# Patient Record
Sex: FEMALE | Race: WHITE | NOT HISPANIC OR LATINO | Employment: OTHER | URBAN - METROPOLITAN AREA
[De-identification: names, ages, dates, MRNs, and addresses within clinical notes are randomized per-mention and may not be internally consistent; named-entity substitution may affect disease eponyms.]

---

## 2017-01-06 ENCOUNTER — HOSPITAL ENCOUNTER (OUTPATIENT)
Dept: LAB | Facility: MEDICAL CENTER | Age: 67
End: 2017-01-06
Attending: FAMILY MEDICINE
Payer: COMMERCIAL

## 2017-01-06 LAB
25(OH)D3 SERPL-MCNC: 26 NG/ML (ref 30–100)
ALBUMIN SERPL BCP-MCNC: 4.7 G/DL (ref 3.2–4.9)
ALBUMIN/GLOB SERPL: 1.8 G/DL
ALP SERPL-CCNC: 59 U/L (ref 30–99)
ALT SERPL-CCNC: 16 U/L (ref 2–50)
ANION GAP SERPL CALC-SCNC: 7 MMOL/L (ref 0–11.9)
APPEARANCE UR: CLEAR
AST SERPL-CCNC: 21 U/L (ref 12–45)
BACTERIA #/AREA URNS HPF: ABNORMAL /HPF
BASOPHILS # BLD AUTO: 0.03 K/UL (ref 0–0.12)
BASOPHILS NFR BLD AUTO: 0.7 % (ref 0–1.8)
BILIRUB SERPL-MCNC: 0.9 MG/DL (ref 0.1–1.5)
BILIRUB UR QL STRIP.AUTO: NEGATIVE
BUN SERPL-MCNC: 12 MG/DL (ref 8–22)
CALCIUM SERPL-MCNC: 9.7 MG/DL (ref 8.5–10.5)
CHLORIDE SERPL-SCNC: 103 MMOL/L (ref 96–112)
CHOLEST SERPL-MCNC: 237 MG/DL (ref 100–199)
CO2 SERPL-SCNC: 28 MMOL/L (ref 20–33)
COLOR UR AUTO: ABNORMAL
CREAT SERPL-MCNC: 0.68 MG/DL (ref 0.5–1.4)
CULTURE IF INDICATED INDCX: YES UA CULTURE
EOSINOPHIL # BLD: 0.08 K/UL (ref 0–0.51)
EOSINOPHIL NFR BLD AUTO: 1.8 % (ref 0–6.9)
EPITHELIAL CELLS 1715: ABNORMAL /HPF
EPITHELIAL CELLS RENAL  1715R: ABNORMAL /HPF
ERYTHROCYTE [DISTWIDTH] IN BLOOD BY AUTOMATED COUNT: 42.1 FL (ref 35.9–50)
GLOBULIN SER CALC-MCNC: 2.6 G/DL (ref 1.9–3.5)
GLUCOSE SERPL-MCNC: 99 MG/DL (ref 65–99)
GLUCOSE UR STRIP.AUTO-MCNC: NEGATIVE MG/DL
HCT VFR BLD AUTO: 43.6 % (ref 37–47)
HDLC SERPL-MCNC: 70 MG/DL
HGB BLD-MCNC: 15 G/DL (ref 12–16)
IMM GRANULOCYTES # BLD AUTO: 0.01 K/UL (ref 0–0.11)
IMM GRANULOCYTES NFR BLD AUTO: 0.2 % (ref 0–0.9)
KETONES UR STRIP.AUTO-MCNC: NEGATIVE MG/DL
LDLC SERPL CALC-MCNC: 145 MG/DL
LEUKOCYTE ESTERASE UR QL STRIP.AUTO: ABNORMAL
LYMPHOCYTES # BLD: 1.86 K/UL (ref 1–4.8)
LYMPHOCYTES NFR BLD AUTO: 41.3 % (ref 22–41)
MCH RBC QN AUTO: 31.8 PG (ref 27–33)
MCHC RBC AUTO-ENTMCNC: 34.4 G/DL (ref 33.6–35)
MCV RBC AUTO: 92.6 FL (ref 81.4–97.8)
MICRO URNS: ABNORMAL
MONOCYTES # BLD: 0.31 K/UL (ref 0–0.85)
MONOCYTES NFR BLD AUTO: 6.9 % (ref 0–13.4)
NEUTROPHILS # BLD: 2.21 K/UL (ref 2–7.15)
NEUTROPHILS NFR BLD AUTO: 49.1 % (ref 44–72)
NITRITE UR QL STRIP.AUTO: NEGATIVE
NRBC # BLD AUTO: 0 K/UL
NRBC BLD-RTO: 0 /100 WBC
PH UR: 6 [PH]
PLATELET # BLD AUTO: 271 K/UL (ref 164–446)
PMV BLD AUTO: 10.2 FL (ref 9–12.9)
POTASSIUM SERPL-SCNC: 4.2 MMOL/L (ref 3.6–5.5)
PROT SERPL-MCNC: 7.3 G/DL (ref 6–8.2)
PROT UR QL STRIP: NEGATIVE MG/DL
RBC # BLD AUTO: 4.71 M/UL (ref 4.2–5.4)
RBC #/AREA URNS HPF: ABNORMAL /HPF
RBC UR QL AUTO: NEGATIVE
SODIUM SERPL-SCNC: 138 MMOL/L (ref 135–145)
SP GR UR STRIP.AUTO: 1.01
TRIGL SERPL-MCNC: 109 MG/DL (ref 0–149)
TSH SERPL DL<=0.005 MIU/L-ACNC: 2.55 UIU/ML (ref 0.3–3.7)
WBC # BLD AUTO: 4.5 K/UL (ref 4.8–10.8)
WBC #/AREA URNS HPF: ABNORMAL /HPF

## 2017-01-06 PROCEDURE — 87077 CULTURE AEROBIC IDENTIFY: CPT

## 2017-01-06 PROCEDURE — 80053 COMPREHEN METABOLIC PANEL: CPT

## 2017-01-06 PROCEDURE — 84443 ASSAY THYROID STIM HORMONE: CPT

## 2017-01-06 PROCEDURE — 81001 URINALYSIS AUTO W/SCOPE: CPT

## 2017-01-06 PROCEDURE — 85025 COMPLETE CBC W/AUTO DIFF WBC: CPT

## 2017-01-06 PROCEDURE — 36415 COLL VENOUS BLD VENIPUNCTURE: CPT

## 2017-01-06 PROCEDURE — 80061 LIPID PANEL: CPT

## 2017-01-06 PROCEDURE — 82306 VITAMIN D 25 HYDROXY: CPT

## 2017-01-06 PROCEDURE — 87086 URINE CULTURE/COLONY COUNT: CPT

## 2017-01-09 LAB
BACTERIA UR CULT: ABNORMAL
BACTERIA UR CULT: ABNORMAL
SIGNIFICANT IND 70042: ABNORMAL
SOURCE SOURCE: ABNORMAL

## 2017-08-08 ENCOUNTER — HOSPITAL ENCOUNTER (OUTPATIENT)
Dept: RADIOLOGY | Facility: MEDICAL CENTER | Age: 67
End: 2017-08-08
Attending: OBSTETRICS & GYNECOLOGY
Payer: MEDICARE

## 2017-08-08 DIAGNOSIS — Z12.31 ENCOUNTER FOR SCREENING MAMMOGRAM FOR MALIGNANT NEOPLASM OF BREAST: ICD-10-CM

## 2017-08-08 PROCEDURE — 77063 BREAST TOMOSYNTHESIS BI: CPT

## 2018-02-06 ENCOUNTER — OFFICE VISIT (OUTPATIENT)
Dept: MEDICAL GROUP | Facility: MEDICAL CENTER | Age: 68
End: 2018-02-06
Payer: MEDICARE

## 2018-02-06 VITALS
HEART RATE: 84 BPM | OXYGEN SATURATION: 98 % | DIASTOLIC BLOOD PRESSURE: 68 MMHG | HEIGHT: 62 IN | WEIGHT: 145.5 LBS | BODY MASS INDEX: 26.78 KG/M2 | RESPIRATION RATE: 16 BRPM | SYSTOLIC BLOOD PRESSURE: 104 MMHG | TEMPERATURE: 97.9 F

## 2018-02-06 DIAGNOSIS — M89.9 OSTEOPATHY: ICD-10-CM

## 2018-02-06 DIAGNOSIS — E55.9 VITAMIN D DEFICIENCY: ICD-10-CM

## 2018-02-06 DIAGNOSIS — Z76.89 ESTABLISHING CARE WITH NEW DOCTOR, ENCOUNTER FOR: ICD-10-CM

## 2018-02-06 DIAGNOSIS — Z00.00 PREVENTATIVE HEALTH CARE: ICD-10-CM

## 2018-02-06 PROCEDURE — 99213 OFFICE O/P EST LOW 20 MIN: CPT | Performed by: INTERNAL MEDICINE

## 2018-02-06 ASSESSMENT — PATIENT HEALTH QUESTIONNAIRE - PHQ9: CLINICAL INTERPRETATION OF PHQ2 SCORE: 0

## 2018-02-06 NOTE — PROGRESS NOTES
Chief Complaint   Patient presents with   • Establish Care     Chief complaint: History of osteopenia      HISTORY OF PRESENT ILLNESS: Patient is a 67 y.o. female patient who presents today to discuss the evaluation and management of:          1. Establishing care with new doctor, encounter for    Patient's 2 previous physicians have retired. She generally is a very healthy woman, she was last seen for an annual exam about one year ago. She saw her GYN and had a mammogram done in August 2017.  She had a colonoscopy at age 62, and has had her flu shot this year.  2. Osteopathy    DEXA scan in January 2014 was notable for osteopenia. The patient is not currently taking calcium supplementation. She has not fallen and has no history of fractures.    3. Vitamin D deficiency    Last vitamin D level was 26 in January 2017. She takes 1000 IUs about every other day.    4. Preventative health care    Results for MAURICE SALVADOR (MRN [id number]) as of 2/6/2018 13:12   Ref. Range 1/6/2017 08:30   Cholesterol,Tot Latest Ref Range: 100 - 199 mg/dL 237 (H)   Triglycerides Latest Ref Range: 0 - 149 mg/dL 109   HDL Latest Ref Range: >=40 mg/dL 70   LDL Latest Ref Range: <100 mg/dL 145 (H)     Patient has elevated LDL however in the setting of a high HDL. She is not on any medication. She is following  weight watcher's diet and has lost 10 pounds through exercise and prudent diet.        Patient Active Problem List    Diagnosis Date Noted   • Vitamin D deficiency 12/12/2013   • Osteopenia 12/09/2013        Allergies:Patient has no known allergies.    Current meds including changes today  Current Outpatient Prescriptions   Medication Sig Dispense Refill   • vitamin D (CHOLECALCIFEROL) 1000 UNIT TABS Take 1,000 Units by mouth every day.     • Cyanocobalamin (B-12) 1000 MCG CAPS Take 1 Cap by mouth every day.       No current facility-administered medications for this visit.      Social History   Substance Use Topics   • Smoking status: Never  "Smoker   • Smokeless tobacco: Never Used   • Alcohol use 3.0 - 4.2 oz/week     5 - 7 Glasses of wine per week      Comment: one glass of wine     Social History     Social History Narrative   • No narrative on file       Family History   Problem Relation Age of Onset   • Heart Disease Mother    • Stroke Mother    • Hypertension Mother    • Hyperlipidemia Mother    • Cancer Mother      breast       Review of Systems:  No chest pain, No shortness of breath, No dyspnea on exertion  Gastrointestinal ROS: No abdominal pain, No nausea, vomiting, diarrhea, or constipation        Exam:      Blood pressure 104/68, pulse 84, temperature 36.6 °C (97.9 °F), resp. rate 16, height 1.575 m (5' 2\"), weight 66 kg (145 lb 8.1 oz), SpO2 98 %, not currently breastfeeding.  General:  Well nourished, well developed female in NAD affect and mood within normal limits  Head is grossly normal.  Neck: Supple without adenopathy  Pulmonary: Clear to ausculation.  Normal effort. No rales, rhonchi, or wheezing.  Cardiovascular: Regular rate and rhythm without murmur.   Extremities: no clubbing, cyanosis, or edema.  Neuro: moves all extremities symmetrically    Please note that this dictation was created using voice recognition software. I have made every reasonable attempt to correct obvious errors, but I expect that there are errors of grammar and possibly content that I did not discover before finalizing the note.    Assessment/Plan:  1. Establishing care with new doctor, encounter for    Up-to-date on all screening measures    2. Osteopathy      - DS-BONE DENSITY STUDY (DEXA); Future    3. Vitamin D deficiency      - VITAMIN D,25 HYDROXY; Future    4. Preventative health care      - LIPID PROFILE; Future  - COMP METABOLIC PANEL; Future    Followup: We will give the patient the results of her lab work through my chart, follow up annually sooner when necessary       "

## 2018-02-14 ENCOUNTER — HOSPITAL ENCOUNTER (OUTPATIENT)
Dept: LAB | Facility: MEDICAL CENTER | Age: 68
End: 2018-02-14
Attending: INTERNAL MEDICINE
Payer: MEDICARE

## 2018-02-14 DIAGNOSIS — Z00.00 PREVENTATIVE HEALTH CARE: ICD-10-CM

## 2018-02-14 DIAGNOSIS — E55.9 VITAMIN D DEFICIENCY: ICD-10-CM

## 2018-02-14 LAB
25(OH)D3 SERPL-MCNC: 20 NG/ML (ref 30–100)
ALBUMIN SERPL BCP-MCNC: 4.2 G/DL (ref 3.2–4.9)
ALBUMIN/GLOB SERPL: 1.4 G/DL
ALP SERPL-CCNC: 63 U/L (ref 30–99)
ALT SERPL-CCNC: 12 U/L (ref 2–50)
ANION GAP SERPL CALC-SCNC: 5 MMOL/L (ref 0–11.9)
AST SERPL-CCNC: 20 U/L (ref 12–45)
BILIRUB SERPL-MCNC: 0.8 MG/DL (ref 0.1–1.5)
BUN SERPL-MCNC: 10 MG/DL (ref 8–22)
CALCIUM SERPL-MCNC: 9.4 MG/DL (ref 8.5–10.5)
CHLORIDE SERPL-SCNC: 102 MMOL/L (ref 96–112)
CHOLEST SERPL-MCNC: 222 MG/DL (ref 100–199)
CO2 SERPL-SCNC: 30 MMOL/L (ref 20–33)
CREAT SERPL-MCNC: 0.66 MG/DL (ref 0.5–1.4)
GLOBULIN SER CALC-MCNC: 3 G/DL (ref 1.9–3.5)
GLUCOSE SERPL-MCNC: 92 MG/DL (ref 65–99)
HDLC SERPL-MCNC: 66 MG/DL
LDLC SERPL CALC-MCNC: 130 MG/DL
POTASSIUM SERPL-SCNC: 4.2 MMOL/L (ref 3.6–5.5)
PROT SERPL-MCNC: 7.2 G/DL (ref 6–8.2)
SODIUM SERPL-SCNC: 137 MMOL/L (ref 135–145)
TRIGL SERPL-MCNC: 130 MG/DL (ref 0–149)

## 2018-02-14 PROCEDURE — 82306 VITAMIN D 25 HYDROXY: CPT

## 2018-02-14 PROCEDURE — 80053 COMPREHEN METABOLIC PANEL: CPT

## 2018-02-14 PROCEDURE — 80061 LIPID PANEL: CPT | Mod: GA

## 2018-02-14 PROCEDURE — 36415 COLL VENOUS BLD VENIPUNCTURE: CPT

## 2018-02-22 ENCOUNTER — HOSPITAL ENCOUNTER (OUTPATIENT)
Dept: RADIOLOGY | Facility: MEDICAL CENTER | Age: 68
End: 2018-02-22
Attending: INTERNAL MEDICINE
Payer: MEDICARE

## 2018-02-22 DIAGNOSIS — M89.9 OSTEOPATHY: ICD-10-CM

## 2018-02-22 PROCEDURE — 77080 DXA BONE DENSITY AXIAL: CPT

## 2018-09-26 ENCOUNTER — HOSPITAL ENCOUNTER (OUTPATIENT)
Dept: RADIOLOGY | Facility: MEDICAL CENTER | Age: 68
End: 2018-09-26
Attending: INTERNAL MEDICINE
Payer: MEDICARE

## 2018-09-26 DIAGNOSIS — Z12.39 SCREENING BREAST EXAMINATION: ICD-10-CM

## 2018-09-26 PROCEDURE — 77067 SCR MAMMO BI INCL CAD: CPT

## 2018-11-01 ENCOUNTER — OFFICE VISIT (OUTPATIENT)
Dept: MEDICAL GROUP | Facility: MEDICAL CENTER | Age: 68
End: 2018-11-01
Payer: MEDICARE

## 2018-11-01 VITALS
HEART RATE: 84 BPM | BODY MASS INDEX: 26.76 KG/M2 | WEIGHT: 145.4 LBS | SYSTOLIC BLOOD PRESSURE: 132 MMHG | RESPIRATION RATE: 16 BRPM | DIASTOLIC BLOOD PRESSURE: 80 MMHG | OXYGEN SATURATION: 97 % | HEIGHT: 62 IN | TEMPERATURE: 97.7 F

## 2018-11-01 DIAGNOSIS — M47.22 CERVICAL RADICULOPATHY DUE TO DEGENERATIVE JOINT DISEASE OF SPINE: ICD-10-CM

## 2018-11-01 DIAGNOSIS — Z00.00 PREVENTATIVE HEALTH CARE: ICD-10-CM

## 2018-11-01 DIAGNOSIS — E55.9 VITAMIN D DEFICIENCY: ICD-10-CM

## 2018-11-01 PROCEDURE — 99213 OFFICE O/P EST LOW 20 MIN: CPT | Performed by: INTERNAL MEDICINE

## 2018-11-01 NOTE — PROGRESS NOTES
Chief Complaint   Patient presents with   • Numbness     bilat. x 10 years,pt states symptoms worsened mid september       HISTORY OF PRESENT ILLNESS: Patient is a 68 y.o. female patient who presents today to discuss the evaluation and management of:          1. Cervical radiculopathy due to degenerative joint disease of spine    Patient is complaining of numbness in both hands, she had an MRI about 10 years ago of her cervical spine that showed degenerative disc disease.  She had physical therapy at that time with improvement.  She has noticed increasing numbness, particularly at night which is interfering with her sleep.  She also has some weakness in her right hand, she is unable to do so as she used to.  At this time, she would rather not have the intervention except for a trial of therapy.    2. Vitamin D deficiency    Vitamin D level was low (20) in February.    3. [facility] health care    Results for MAURICE SALVADOR (MRN [id number]) as of 11/1/2018 10:50   Ref. Range 2/14/2018 07:51   Cholesterol,Tot Latest Ref Range: 100 - 199 mg/dL 222 (H)   Triglycerides Latest Ref Range: 0 - 149 mg/dL 130   HDL Latest Ref Range: >=40 mg/dL 66   LDL Latest Ref Range: <100 mg/dL 130 (H)       Mild hyperlipidemia    Patient Active Problem List    Diagnosis Date Noted   • Cervical radiculopathy due to degenerative joint disease of spine 11/01/2018   • Vitamin D deficiency 12/12/2013   • Osteopenia 12/09/2013        Allergies:Patient has no known allergies.    Current meds including changes today  Current Outpatient Prescriptions   Medication Sig Dispense Refill   • vitamin D (CHOLECALCIFEROL) 1000 UNIT TABS Take 1,000 Units by mouth every day.     • Cyanocobalamin (B-12) 1000 MCG CAPS Take 1 Cap by mouth every day.       No current facility-administered medications for this visit.      Social History   Substance Use Topics   • Smoking status: Never Smoker   • Smokeless tobacco: Never Used   • Alcohol use 3.0 - 4.2 oz/week     5  "- 7 Glasses of wine per week      Comment: one glass of wine     Social History     Social History Narrative   • No narrative on file       Family History   Problem Relation Age of Onset   • Heart Disease Mother    • Stroke Mother    • Hypertension Mother    • Hyperlipidemia Mother    • Cancer Mother         breast       Review of Systems:  No chest pain, No shortness of breath, No dyspnea on exertion  Gastrointestinal ROS: No abdominal pain, No nausea, vomiting, diarrhea, or constipation        Exam:      Blood pressure 132/80, pulse 84, temperature 36.5 °C (97.7 °F), temperature source Temporal, resp. rate 16, height 1.575 m (5' 2\"), weight 66 kg (145 lb 6.4 oz), SpO2 97 %, not currently breastfeeding.  General:  Well nourished, well developed female in NAD affect and mood within normal limits  Head is grossly normal.  Neck: Supple without adenopathy  Pulmonary: Clear to ausculation.  Normal effort. No rales, rhonchi, or wheezing.  Cardiovascular: Regular rate and rhythm without murmur.   Extremities: no clubbing, cyanosis, or edema.  Neuro: moves all extremities symmetrically    Please note that this dictation was created using voice recognition software. I have made every reasonable attempt to correct obvious errors, but I expect that there are errors of grammar and possibly content that I did not discover before finalizing the note.    Assessment/Plan:  1. Cervical radiculopathy due to degenerative joint disease of spine    Patient will let me know if her or her therapist feel that she is not responding to treatment, and then I can order MRI.    - REFERRAL TO PHYSICAL THERAPY Reason for Therapy: Eval/Treat/Report    2. Vitamin D deficiency    Patient will go to have her blood drawn in February when she is due for her annual  - VITAMIN D,25 HYDROXY; Future    3. Preventative health care      - LIPID PROFILE; Future  - BASIC METABOLIC PANEL; Future    Followup: Patient will make appointment for annual in " February.

## 2019-02-28 ENCOUNTER — HOSPITAL ENCOUNTER (OUTPATIENT)
Dept: LAB | Facility: MEDICAL CENTER | Age: 69
End: 2019-02-28
Attending: INTERNAL MEDICINE
Payer: MEDICARE

## 2019-02-28 DIAGNOSIS — Z00.00 PREVENTATIVE HEALTH CARE: ICD-10-CM

## 2019-02-28 DIAGNOSIS — E55.9 VITAMIN D DEFICIENCY: ICD-10-CM

## 2019-02-28 LAB
25(OH)D3 SERPL-MCNC: 38 NG/ML (ref 30–100)
ANION GAP SERPL CALC-SCNC: 9 MMOL/L (ref 0–11.9)
BUN SERPL-MCNC: 17 MG/DL (ref 8–22)
CALCIUM SERPL-MCNC: 10.2 MG/DL (ref 8.5–10.5)
CHLORIDE SERPL-SCNC: 103 MMOL/L (ref 96–112)
CHOLEST SERPL-MCNC: 205 MG/DL (ref 100–199)
CO2 SERPL-SCNC: 26 MMOL/L (ref 20–33)
CREAT SERPL-MCNC: 0.79 MG/DL (ref 0.5–1.4)
FASTING STATUS PATIENT QL REPORTED: NORMAL
GLUCOSE SERPL-MCNC: 92 MG/DL (ref 65–99)
HDLC SERPL-MCNC: 67 MG/DL
LDLC SERPL CALC-MCNC: 120 MG/DL
POTASSIUM SERPL-SCNC: 4.4 MMOL/L (ref 3.6–5.5)
SODIUM SERPL-SCNC: 138 MMOL/L (ref 135–145)
TRIGL SERPL-MCNC: 88 MG/DL (ref 0–149)

## 2019-02-28 PROCEDURE — 82306 VITAMIN D 25 HYDROXY: CPT

## 2019-02-28 PROCEDURE — 80048 BASIC METABOLIC PNL TOTAL CA: CPT

## 2019-02-28 PROCEDURE — 80061 LIPID PANEL: CPT | Mod: GA

## 2019-02-28 PROCEDURE — 36415 COLL VENOUS BLD VENIPUNCTURE: CPT

## 2019-10-31 ENCOUNTER — NON-PROVIDER VISIT (OUTPATIENT)
Dept: MEDICAL GROUP | Facility: MEDICAL CENTER | Age: 69
End: 2019-10-31
Payer: MEDICARE

## 2019-10-31 DIAGNOSIS — Z23 NEED FOR VACCINATION: ICD-10-CM

## 2019-10-31 PROCEDURE — 90662 IIV NO PRSV INCREASED AG IM: CPT | Performed by: FAMILY MEDICINE

## 2019-10-31 PROCEDURE — G0008 ADMIN INFLUENZA VIRUS VAC: HCPCS | Performed by: FAMILY MEDICINE

## 2019-10-31 NOTE — PROGRESS NOTES
"Iesha Galvan is a 69 y.o. female here for a non-provider visit for:   FLU    Reason for immunization: Annual Flu Vaccine  Immunization records indicate need for vaccine: Yes, confirmed with GIANCARLO Coronel  Minimum interval has been met for this vaccine: Yes  ABN completed: No    Order and dose verified by: bakari  VIS Dated  8/15/2019 was given to patient: Yes  All IAC Questionnaire questions were answered \"No.\"    Patient tolerated injection and no adverse effects were observed or reported: Yes    Pt scheduled for next dose in series: No    "

## 2019-12-31 ENCOUNTER — OFFICE VISIT (OUTPATIENT)
Dept: MEDICAL GROUP | Facility: MEDICAL CENTER | Age: 69
End: 2019-12-31
Payer: MEDICARE

## 2019-12-31 VITALS
OXYGEN SATURATION: 97 % | SYSTOLIC BLOOD PRESSURE: 104 MMHG | TEMPERATURE: 97.5 F | HEIGHT: 62 IN | WEIGHT: 149.03 LBS | DIASTOLIC BLOOD PRESSURE: 66 MMHG | BODY MASS INDEX: 27.43 KG/M2 | HEART RATE: 75 BPM

## 2019-12-31 DIAGNOSIS — E78.00 PURE HYPERCHOLESTEROLEMIA: ICD-10-CM

## 2019-12-31 DIAGNOSIS — M79.644 PAIN IN FINGER OF BOTH HANDS: ICD-10-CM

## 2019-12-31 DIAGNOSIS — E66.3 OVERWEIGHT: ICD-10-CM

## 2019-12-31 DIAGNOSIS — Z11.59 NEED FOR HEPATITIS C SCREENING TEST: ICD-10-CM

## 2019-12-31 DIAGNOSIS — H93.8X1 SENSATION OF FULLNESS IN RIGHT EAR: ICD-10-CM

## 2019-12-31 DIAGNOSIS — Z23 NEED FOR VACCINATION: ICD-10-CM

## 2019-12-31 DIAGNOSIS — M47.22 CERVICAL RADICULOPATHY DUE TO DEGENERATIVE JOINT DISEASE OF SPINE: ICD-10-CM

## 2019-12-31 DIAGNOSIS — M79.645 PAIN IN FINGER OF BOTH HANDS: ICD-10-CM

## 2019-12-31 PROCEDURE — 99214 OFFICE O/P EST MOD 30 MIN: CPT | Mod: 25 | Performed by: FAMILY MEDICINE

## 2019-12-31 PROCEDURE — G0009 ADMIN PNEUMOCOCCAL VACCINE: HCPCS | Performed by: FAMILY MEDICINE

## 2019-12-31 PROCEDURE — 90670 PCV13 VACCINE IM: CPT | Performed by: FAMILY MEDICINE

## 2019-12-31 ASSESSMENT — PATIENT HEALTH QUESTIONNAIRE - PHQ9: CLINICAL INTERPRETATION OF PHQ2 SCORE: 0

## 2019-12-31 NOTE — ASSESSMENT & PLAN NOTE
Chronic problem. She was referred for PT, however, did not ever start. Continues to have neck pain, worst on the right side. Gets b/l intermittent hand numbness at night, but improves immediately if she moves her arms around. She does plan to schedule an appt with PT.

## 2019-12-31 NOTE — ASSESSMENT & PLAN NOTE
New problem. Started with a cold about 2 weeks ago. Now the right ear feels plugged and full. No pain. Notes decreased hearing. Popping frequently.

## 2019-12-31 NOTE — ASSESSMENT & PLAN NOTE
Genetic h/o duputryns. Starting on the 5th digit on the right hand. However, only having significant pain on the DIP of the 3rd finginer of the left hand. Red and swollen. WOrst in the morning. Has been going for years.

## 2019-12-31 NOTE — PROGRESS NOTES
Subjective:     CC: Diagnoses of Cervical radiculopathy due to degenerative joint disease of spine, Sensation of fullness in right ear, Pain in finger of both hands, Pure hypercholesterolemia, Overweight, and Need for hepatitis C screening test were pertinent to this visit.    HPI: Patient is a 69 y.o. female established patient who presents today to establish care.  She is generally quite healthy.  Her only chronic medical problem is cervical radiculopathy.  She does have a couple other concerns today.      Cervical radiculopathy due to degenerative joint disease of spine  Chronic problem. She was referred for PT, however, did not ever start. Continues to have neck pain, worst on the right side. Gets b/l intermittent hand numbness at night, but improves immediately if she moves her arms around. She does plan to schedule an appt with PT.     Sensation of fullness in right ear  New problem. Started with a cold about 2 weeks ago. Now the right ear feels plugged and full. No pain. Notes decreased hearing. Popping frequently.     Pain in finger of both hands  Genetic h/o duputryns. Starting on the 5th digit on the right hand. However, only having significant pain on the DIP of the 3rd finginer of the left hand. Red and swollen. WOrst in the morning. Has been going for years.       Past Medical History:   Diagnosis Date   • Endometriosis of ovary    • Osteopenia 12/9/2013       Social History     Tobacco Use   • Smoking status: Never Smoker   • Smokeless tobacco: Never Used   Substance Use Topics   • Alcohol use: Yes     Alcohol/week: 3.0 - 4.2 oz     Types: 5 - 7 Glasses of wine per week     Comment: one glass of wine   • Drug use: No       Current Outpatient Medications Ordered in Epic   Medication Sig Dispense Refill   • Diclofenac Sodium 1 % Gel Apply thin layer to affected joint qid prn pain. 1 Tube 2   • vitamin D (CHOLECALCIFEROL) 1000 UNIT TABS Take 1,000 Units by mouth every day.     • Cyanocobalamin (B-12) 1000  "MCG CAPS Take 1 Cap by mouth every day.       No current [facility]-ordered facility-administered medications on file.        Allergies:  Patient has no known allergies.    Health Maintenance: Completed    ROS:  Pulm: no sob, no cough  CV: no chest pain, no palpitations      Objective:       Exam:  /66 (BP Location: Left arm, Patient Position: Sitting, BP Cuff Size: Adult)   Pulse 75   Temp 36.4 °C (97.5 °F) (Temporal)   Ht 1.575 m (5' 2\")   Wt 67.6 kg (149 lb 0.5 oz)   SpO2 97%   BMI 27.26 kg/m²  Body mass index is 27.26 kg/m².      General: Normal appearing. No distress.  HEAD: NCAT  EYES: conjunctiva clear, lids without ptosis, pupils equal  and reactive to light  EARS: ears normal shape and contour, bilateral cerumen impaction.  Unable to visualize tympanic membranes.  MOUTH: oropharynx is without erythema, edema or exudates.   Neck: Supple without masses. Thyroid is not enlarged. Normal ROM  Pulmonary: Clear to ausculation.  Normal effort. No rales, ronchi, or wheezing.  Cardiovascular: Regular rate and rhythm, no murmur. No LE edema  Neurologic: Grossly normal, no focal deficits  Lymph: No cervical or supraclavicular lymph nodes are palpable  Skin: Warm and dry.  No obvious lesions.  Musculoskeletal: Normal gait and station.   Psych: Normal mood and affect. Alert and oriented x3. Judgment and insight is normal.      Labs: 2/28/19 Results reviewed and discussed with the patient, questions answered.    Assessment & Plan:     69 y.o. female with the following -     1. Cervical radiculopathy due to degenerative joint disease of spine  Chronic problem, uncontrolled.  Patient continues to right-sided neck pain.  She does have intermittent numbness in the hands, however, only at night and improved with positional change.  Encouraged the patient to schedule an appointment physical therapy, referral was already placed.    2. Sensation of fullness in right ear  New problem.  Started after a cold around 2 weeks " ago.  Possibly due to eustachian dysfunction, however, the patient does have bilateral cerumen impaction.  Ears flushed today with significant improvement in symptoms.     3. Pain in finger of both hands  New problem.  The patient states that she has had pain in the DIP of the third finger on the left hand for many years.  Notes redness and swelling in that joint as well over the past few years.  She is not interested in lab work-up or imaging today.  Prescription given for diclofenac gel.  No signs of infection.  No signs of trigger finger.  Advised to follow-up if pain worsens.  - Diclofenac Sodium 1 % Gel; Apply thin layer to affected joint qid prn pain.  Dispense: 1 Tube; Refill: 2    4. Pure hypercholesterolemia  Chronic problem.  Lipid panel done in February 2019 showed a slight elevation of her LDL at 120.  The patient had been working on diet and exercise and lowered it from 140 previously.  The patient has noted around 5 pounds of weight gain over the past year.  Repeat lipid panel ordered.  - Lipid Profile; Future  - Comp Metabolic Panel; Future    5. Overweight  - Comp Metabolic Panel; Future    6. Need for hepatitis C screening test  - HEP C VIRUS ANTIBODY; Future      No follow-ups on file.    Please note that this dictation was created using voice recognition software. I have made every reasonable attempt to correct obvious errors, but I expect that there are errors of grammar and possibly content that I did not discover before finalizing the note.

## 2020-01-01 DIAGNOSIS — M79.644 PAIN IN FINGER OF BOTH HANDS: ICD-10-CM

## 2020-01-01 DIAGNOSIS — M79.645 PAIN IN FINGER OF BOTH HANDS: ICD-10-CM

## 2020-02-19 ENCOUNTER — HOSPITAL ENCOUNTER (OUTPATIENT)
Dept: RADIOLOGY | Facility: MEDICAL CENTER | Age: 70
End: 2020-02-19
Attending: INTERNAL MEDICINE
Payer: MEDICARE

## 2020-02-19 DIAGNOSIS — Z12.31 VISIT FOR SCREENING MAMMOGRAM: ICD-10-CM

## 2020-02-19 PROCEDURE — 77067 SCR MAMMO BI INCL CAD: CPT

## 2020-03-04 ENCOUNTER — HOSPITAL ENCOUNTER (OUTPATIENT)
Dept: LAB | Facility: MEDICAL CENTER | Age: 70
End: 2020-03-04
Attending: FAMILY MEDICINE
Payer: MEDICARE

## 2020-03-04 DIAGNOSIS — Z11.59 NEED FOR HEPATITIS C SCREENING TEST: ICD-10-CM

## 2020-03-04 DIAGNOSIS — E78.00 PURE HYPERCHOLESTEROLEMIA: ICD-10-CM

## 2020-03-04 DIAGNOSIS — E66.3 OVERWEIGHT: ICD-10-CM

## 2020-03-04 LAB
ALBUMIN SERPL BCP-MCNC: 4.3 G/DL (ref 3.2–4.9)
ALBUMIN/GLOB SERPL: 1.7 G/DL
ALP SERPL-CCNC: 61 U/L (ref 30–99)
ALT SERPL-CCNC: 14 U/L (ref 2–50)
ANION GAP SERPL CALC-SCNC: 6 MMOL/L (ref 0–11.9)
AST SERPL-CCNC: 19 U/L (ref 12–45)
BILIRUB SERPL-MCNC: 0.6 MG/DL (ref 0.1–1.5)
BUN SERPL-MCNC: 14 MG/DL (ref 8–22)
CALCIUM SERPL-MCNC: 9.2 MG/DL (ref 8.5–10.5)
CHLORIDE SERPL-SCNC: 107 MMOL/L (ref 96–112)
CHOLEST SERPL-MCNC: 202 MG/DL (ref 100–199)
CO2 SERPL-SCNC: 27 MMOL/L (ref 20–33)
CREAT SERPL-MCNC: 0.76 MG/DL (ref 0.5–1.4)
GLOBULIN SER CALC-MCNC: 2.6 G/DL (ref 1.9–3.5)
GLUCOSE SERPL-MCNC: 94 MG/DL (ref 65–99)
HDLC SERPL-MCNC: 65 MG/DL
LDLC SERPL CALC-MCNC: 117 MG/DL
POTASSIUM SERPL-SCNC: 4.1 MMOL/L (ref 3.6–5.5)
PROT SERPL-MCNC: 6.9 G/DL (ref 6–8.2)
SODIUM SERPL-SCNC: 140 MMOL/L (ref 135–145)
TRIGL SERPL-MCNC: 101 MG/DL (ref 0–149)

## 2020-03-04 PROCEDURE — 36415 COLL VENOUS BLD VENIPUNCTURE: CPT

## 2020-03-04 PROCEDURE — 80053 COMPREHEN METABOLIC PANEL: CPT

## 2020-03-04 PROCEDURE — 80061 LIPID PANEL: CPT

## 2020-07-16 ENCOUNTER — OFFICE VISIT (OUTPATIENT)
Dept: MEDICAL GROUP | Facility: MEDICAL CENTER | Age: 70
End: 2020-07-16
Payer: MEDICARE

## 2020-07-16 VITALS
HEIGHT: 62 IN | BODY MASS INDEX: 27.79 KG/M2 | WEIGHT: 151 LBS | OXYGEN SATURATION: 98 % | HEART RATE: 74 BPM | DIASTOLIC BLOOD PRESSURE: 76 MMHG | TEMPERATURE: 97.7 F | SYSTOLIC BLOOD PRESSURE: 124 MMHG

## 2020-07-16 DIAGNOSIS — M25.551 RIGHT HIP PAIN: ICD-10-CM

## 2020-07-16 DIAGNOSIS — M79.671 RIGHT FOOT PAIN: ICD-10-CM

## 2020-07-16 DIAGNOSIS — R22.41 LOCALIZED SWELLING OF RIGHT FOOT: ICD-10-CM
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
## 2020-07-16 PROCEDURE — 99214 OFFICE O/P EST MOD 30 MIN: CPT | Performed by: FAMILY MEDICINE

## 2020-07-16 ASSESSMENT — PATIENT HEALTH QUESTIONNAIRE - PHQ9: CLINICAL INTERPRETATION OF PHQ2 SCORE: 0

## 2020-07-16 NOTE — ASSESSMENT & PLAN NOTE
The patient fell while racing her grandson on the grass.   Felt a pop and pain in the buttock on the right. Pain was rather severe in that area for the first couple weeks. Now seems to be improving but still quite sore and now she is have pain in the right groin.   Lastly she stated noting swelling in the right foot and some numbness in theat area after wearing her tennis shoes.

## 2020-07-16 NOTE — PROGRESS NOTES
"Subjective:     CC: Diagnoses of Fall, initial encounter, Right hip pain, Right foot pain, and Localized swelling of right foot were pertinent to this visit.    HPI: Patient is a 70 y.o. female established patient who presents today with concern regarding multiple msk complaints.       Fall  The patient fell while racing her grandson on the grass.   Felt a pop and pain in the buttock on the right. Pain was rather severe in that area for the first couple weeks. Now seems to be improving but still quite sore and now she is have pain in the right groin.   Lastly she stated noting swelling in the right foot and some numbness in theat area after wearing her tennis shoes.       Past Medical History:   Diagnosis Date   • Endometriosis of ovary    • Osteopenia 12/9/2013       Social History     Tobacco Use   • Smoking status: Never Smoker   • Smokeless tobacco: Never Used   Substance Use Topics   • Alcohol use: Yes     Alcohol/week: 3.0 - 4.2 oz     Types: 5 - 7 Glasses of wine per week     Comment: one glass of wine   • Drug use: No       Current Outpatient Medications Ordered in Epic   Medication Sig Dispense Refill   • Diclofenac Sodium 1 % Gel Apply thin layer to affected joint qid prn pain. 1 Tube 2   • vitamin D (CHOLECALCIFEROL) 1000 UNIT TABS Take 1,000 Units by mouth every day.     • Cyanocobalamin (B-12) 1000 MCG CAPS Take 1 Cap by mouth every day.       No current [facility]-ordered facility-administered medications on file.        Allergies:  Patient has no known allergies.    Health Maintenance: Completed    ROS:    Pulm: no sob, no cough  CV: no chest pain, no palpitations      Objective:       Exam:  /76 (BP Location: Right arm, Patient Position: Sitting, BP Cuff Size: Adult long)   Pulse 74   Temp 36.5 °C (97.7 °F) (Temporal)   Ht 1.575 m (5' 2\")   Wt 68.5 kg (151 lb)   SpO2 98%   BMI 27.62 kg/m²  Body mass index is 27.62 kg/m².    General: Normal appearing. No distress.  HEAD: NCAT  EYES: conjunctiva " clear, lids without ptosis, pupils equal and reactive to light  EARS: ears normal shape and contour.  MOUTH: normal dentition   Neck:  Normal ROM  Pulmonary: Normal effort. Normal respiratory rate.  Cardiovascular: Well perfused. No LE edema  Neurologic: Grossly normal, no focal deficits  Skin: Warm and dry.  No obvious lesions.  Musculoskeletal: Normal gait and station. Tenderness to palpation in the inferior part of the R buttock. Mild pain with FADIR. Normal ROM. Small area of swelling over the mid anteiror portion of the right foot, non-tender.   Psych: Normal mood and affect. Alert and oriented x3. Judgment and insight is normal. EXAM      Assessment & Plan:     70 y.o. female with the following -     1. Fall, initial encounter  New proble. Referral placed to sports med. Reassured patient this is not likely to be due to any sort of arterial disease which she was quite concerned about.   - REFERRAL TO SPORTS MEDICINE    2. Right hip pain  - REFERRAL TO SPORTS MEDICINE    3. Right foot pain  - REFERRAL TO SPORTS MEDICINE    4. Localized swelling of right foot  - REFERRAL TO SPORTS MEDICINE      No follow-ups on file.    Please note that this dictation was created using voice recognition software. I have made every reasonable attempt to correct obvious errors, but I expect that there are errors of grammar and possibly content that I did not discover before finalizing the note.

## 2020-12-17 ENCOUNTER — OFFICE VISIT (OUTPATIENT)
Dept: MEDICAL GROUP | Facility: MEDICAL CENTER | Age: 70
End: 2020-12-17
Payer: MEDICARE

## 2020-12-17 VITALS
OXYGEN SATURATION: 94 % | DIASTOLIC BLOOD PRESSURE: 70 MMHG | HEART RATE: 84 BPM | SYSTOLIC BLOOD PRESSURE: 110 MMHG | BODY MASS INDEX: 27.79 KG/M2 | WEIGHT: 151 LBS | TEMPERATURE: 97.8 F | HEIGHT: 62 IN

## 2020-12-17 DIAGNOSIS — Z23 NEED FOR VACCINATION: ICD-10-CM

## 2020-12-17 DIAGNOSIS — E66.3 OVERWEIGHT: ICD-10-CM

## 2020-12-17 DIAGNOSIS — R79.89 LOW VITAMIN D LEVEL: ICD-10-CM

## 2020-12-17 DIAGNOSIS — E55.9 VITAMIN D DEFICIENCY: ICD-10-CM

## 2020-12-17 DIAGNOSIS — E78.00 PURE HYPERCHOLESTEROLEMIA: ICD-10-CM

## 2020-12-17 PROBLEM — M79.644 PAIN IN FINGER OF BOTH HANDS: Status: RESOLVED | Noted: 2019-12-31 | Resolved: 2020-12-17

## 2020-12-17 PROBLEM — M47.22 CERVICAL RADICULOPATHY DUE TO DEGENERATIVE JOINT DISEASE OF SPINE: Status: RESOLVED | Noted: 2018-11-01 | Resolved: 2020-12-17

## 2020-12-17 PROBLEM — H93.8X1 SENSATION OF FULLNESS IN RIGHT EAR: Status: RESOLVED | Noted: 2019-12-31 | Resolved: 2020-12-17

## 2020-12-17 PROBLEM — M79.645 PAIN IN FINGER OF BOTH HANDS: Status: RESOLVED | Noted: 2019-12-31 | Resolved: 2020-12-17
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
## 2020-12-17 PROCEDURE — G0008 ADMIN INFLUENZA VIRUS VAC: HCPCS | Performed by: FAMILY MEDICINE

## 2020-12-17 PROCEDURE — G0439 PPPS, SUBSEQ VISIT: HCPCS | Performed by: FAMILY MEDICINE

## 2020-12-17 PROCEDURE — 90662 IIV NO PRSV INCREASED AG IM: CPT | Performed by: FAMILY MEDICINE

## 2020-12-17 ASSESSMENT — ENCOUNTER SYMPTOMS: GENERAL WELL-BEING: EXCELLENT

## 2020-12-17 ASSESSMENT — PATIENT HEALTH QUESTIONNAIRE - PHQ9: CLINICAL INTERPRETATION OF PHQ2 SCORE: 0

## 2020-12-17 ASSESSMENT — ACTIVITIES OF DAILY LIVING (ADL): BATHING_REQUIRES_ASSISTANCE: 0

## 2020-12-17 NOTE — PROGRESS NOTES
Chief Complaint   Patient presents with   • Medicare Annual Wellness         HPI:  Iesha Galvan is a 70 y.o. here for Medicare Annual Wellness Visit     Pure hypercholesterolemia  Chronic problem. Mild. ASCVD risk 6.9%    Vitamin D deficiency  Chronic problem. Currently taking supplemental Vit D.   Does have h/o osteopenia.   Up to date on bone density test.       Patient Active Problem List    Diagnosis Date Noted   • Pure hypercholesterolemia 12/17/2020   • Vitamin D deficiency 12/12/2013   • Osteopenia 12/09/2013       Current Outpatient Medications   Medication Sig Dispense Refill   • Diclofenac Sodium 1 % Gel Apply thin layer to affected joint qid prn pain. 1 Tube 2   • vitamin D (CHOLECALCIFEROL) 1000 UNIT TABS Take 1,000 Units by mouth every day.       No current facility-administered medications for this visit.             Current supplements as per medication list.       Allergies: Patient has no known allergies.    Current social contact/activities:      She  reports that she has never smoked. She has never used smokeless tobacco. She reports current alcohol use of about 3.0 - 4.2 oz of alcohol per week. She reports that she does not use drugs.  Counseling given: Not Answered      DPA/Advanced Directive:  Patient has Living Will, but it is not on file. Instructed to bring in a copy to scan into their chart.    ROS:    Gait: Uses no assistive device  Ostomy: No  Other tubes: No  Amputations: No  Chronic oxygen use: No  Last eye exam: 3 years ago  Wears hearing aids: No   : Denies any urinary leakage during the last 6 months    Screening:    Depression Screening    Little interest or pleasure in doing things?  0 - not at all  Feeling down, depressed , or hopeless? 0 - not at all  Patient Health Questionnaire Score: 0     If depressive symptoms identified deferred to follow up visit unless specifically addressed in assessment and plan.    Interpretation of PHQ-9 Total Score   Score Severity   1-4 No  Depression   5-9 Mild Depression   10-14 Moderate Depression   15-19 Moderately Severe Depression   20-27 Severe Depression    Screening for Cognitive Impairment    Three Minute Recall (river, nation, finger) 2/3    Alexis clock face with all 12 numbers and set the hands to show 10 past 11.  Yes    Cognitive concerns identified deferred for follow up unless specifically addressed in assessment and plan.    Fall Risk Assessment    Has the patient had two or more falls in the last year or any fall with injury in the last year?  No    Safety Assessment    Throw rugs on floor.  Yes  Handrails on all stairs.  Yes  Good lighting in all hallways.  Yes  Difficulty hearing.  No  Patient counseled about all safety risks that were identified.    Functional Assessment ADLs    Are there any barriers preventing you from cooking for yourself or meeting nutritional needs?  No.    Are there any barriers preventing you from driving safely or obtaining transportation?  No.    Are there any barriers preventing you from using a telephone or calling for help?  No.    Are there any barriers preventing you from shopping?  No.    Are there any barriers preventing you from taking care of your own finances?  No.    Are there any barriers preventing you from managing your medications?  No.    Are there any barriers preventing you from showering, bathing or dressing yourself?  No.    Are you currently engaging in any exercise or physical activity?  Yes.  Walking 2.5 miles a day   What is your perception of your health?  Excellent.      Health Maintenance Summary                Annual Wellness Visit Overdue 1950     HEPATITIS C SCREENING Overdue 1950     IMM ZOSTER VACCINES Overdue 11/7/2014      Done 9/12/2014 Imm Admin: Zoster Vaccine Live (ZVL) (Zostavax) - HISTORICAL DATA    IMM PNEUMOCOCCAL VACCINE: 65+ Years Next Due 12/31/2020      Done 12/31/2019 Imm Admin: Pneumococcal Conjugate Vaccine (Prevnar/PCV-13)    MAMMOGRAM Next Due  "2/19/2021      Done 2/19/2020 MA-SCREENING MAMMO BILAT W/TOMOSYNTHESIS W/CAD     Patient has more history with this topic...    COLONOSCOPY Next Due 12/29/2022      Not specified 12/29/2012     BONE DENSITY Next Due 2/22/2023      Done 2/22/2018 DS-BONE DENSITY STUDY (DEXA)     Patient has more history with this topic...    IMM DTaP/Tdap/Td Vaccine Next Due 12/29/2024      Done 12/29/2014 Imm Admin: Tdap Vaccine          Patient Care Team:  Nelly Mcbride M.D. as PCP - General (Family Medicine)        Social History     Tobacco Use   • Smoking status: Never Smoker   • Smokeless tobacco: Never Used   Substance Use Topics   • Alcohol use: Yes     Alcohol/week: 3.0 - 4.2 oz     Types: 5 - 7 Glasses of wine per week     Comment: one glass of wine   • Drug use: No     Family History   Problem Relation Age of Onset   • Heart Disease Mother    • Stroke Mother    • Hypertension Mother    • Hyperlipidemia Mother    • Cancer Mother         breast     She  has a past medical history of Endometriosis of ovary and Osteopenia (12/9/2013).   Past Surgical History:   Procedure Laterality Date   • OOPHORECTOMY  1980    partial   • TONSILLECTOMY         Exam:   /70 (BP Location: Left arm, Patient Position: Sitting, BP Cuff Size: Adult long)   Pulse 84   Temp 36.6 °C (97.8 °F) (Temporal)   Ht 1.575 m (5' 2\")   Wt 68.5 kg (151 lb)   SpO2 94%  Body mass index is 27.62 kg/m².    Hearing good.    Dentition good  Alert, oriented in no acute distress.  Eye contact is good, speech goal directed, affect calm    Assessment and Plan. The following treatment and monitoring plan is recommended:    1. Need for vaccination  Influenza Vaccine, High Dose (65+ Only)   2. Pure hypercholesterolemia  Lipid Profile   3. Overweight  Comp Metabolic Panel    Lipid Profile   4. Low vitamin D level  VITAMIN D,25 HYDROXY   5. Vitamin D deficiency       Due for pna and shingles vaccine. Patient will come back for MA to get those (per her " request).    Services suggested: No services needed at this time  Health Care Screening: Age-appropriate preventive services recommended by USPTF and ACIP covered by Medicare were discussed today. Services ordered if indicated and agreed upon by the patient.  Referrals offered: Community-based lifestyle interventions to reduce health risks and promote self-management and wellness, fall prevention, nutrition, physical activity, tobacco-use cessation, weight loss, and mental health services as per orders if indicated.    Discussion today about general wellness and lifestyle habits:    · Prevent falls and reduce trip hazards; Cautioned about securing or removing rugs.  · Have a working fire alarm and carbon monoxide detector;   · Engage in regular physical activity and social activities     Follow-up: Return in about 1 year (around 12/17/2021), or if symptoms worsen or fail to improve.

## 2020-12-17 NOTE — ASSESSMENT & PLAN NOTE
Chronic problem. Currently taking supplemental Vit D.   Does have h/o osteopenia.   Up to date on bone density test.

## 2021-03-03 DIAGNOSIS — Z23 NEED FOR VACCINATION: ICD-10-CM

## 2021-03-25 ENCOUNTER — HOSPITAL ENCOUNTER (OUTPATIENT)
Dept: LAB | Facility: MEDICAL CENTER | Age: 71
End: 2021-03-25
Attending: FAMILY MEDICINE
Payer: MEDICARE

## 2021-03-25 DIAGNOSIS — E78.00 PURE HYPERCHOLESTEROLEMIA: ICD-10-CM

## 2021-03-25 DIAGNOSIS — R79.89 LOW VITAMIN D LEVEL: ICD-10-CM

## 2021-03-25 DIAGNOSIS — E66.3 OVERWEIGHT: ICD-10-CM

## 2021-03-25 LAB
25(OH)D3 SERPL-MCNC: 27 NG/ML (ref 30–100)
ALBUMIN SERPL BCP-MCNC: 4.5 G/DL (ref 3.2–4.9)
ALBUMIN/GLOB SERPL: 1.5 G/DL
ALP SERPL-CCNC: 75 U/L (ref 30–99)
ALT SERPL-CCNC: 15 U/L (ref 2–50)
ANION GAP SERPL CALC-SCNC: 9 MMOL/L (ref 7–16)
AST SERPL-CCNC: 19 U/L (ref 12–45)
BILIRUB SERPL-MCNC: 0.6 MG/DL (ref 0.1–1.5)
BUN SERPL-MCNC: 13 MG/DL (ref 8–22)
CALCIUM SERPL-MCNC: 9.8 MG/DL (ref 8.5–10.5)
CHLORIDE SERPL-SCNC: 106 MMOL/L (ref 96–112)
CHOLEST SERPL-MCNC: 233 MG/DL (ref 100–199)
CO2 SERPL-SCNC: 27 MMOL/L (ref 20–33)
CREAT SERPL-MCNC: 0.69 MG/DL (ref 0.5–1.4)
FASTING STATUS PATIENT QL REPORTED: NORMAL
GLOBULIN SER CALC-MCNC: 3.1 G/DL (ref 1.9–3.5)
GLUCOSE SERPL-MCNC: 90 MG/DL (ref 65–99)
HDLC SERPL-MCNC: 68 MG/DL
LDLC SERPL CALC-MCNC: 143 MG/DL
POTASSIUM SERPL-SCNC: 4.1 MMOL/L (ref 3.6–5.5)
PROT SERPL-MCNC: 7.6 G/DL (ref 6–8.2)
SODIUM SERPL-SCNC: 142 MMOL/L (ref 135–145)
TRIGL SERPL-MCNC: 111 MG/DL (ref 0–149)

## 2021-03-25 PROCEDURE — 82306 VITAMIN D 25 HYDROXY: CPT | Mod: GA

## 2021-03-25 PROCEDURE — 80053 COMPREHEN METABOLIC PANEL: CPT

## 2021-03-25 PROCEDURE — 80061 LIPID PANEL: CPT

## 2021-03-25 PROCEDURE — 36415 COLL VENOUS BLD VENIPUNCTURE: CPT | Mod: GA

## 2021-04-19 ENCOUNTER — APPOINTMENT (OUTPATIENT)
Dept: RADIOLOGY | Facility: MEDICAL CENTER | Age: 71
End: 2021-04-19
Attending: FAMILY MEDICINE
Payer: MEDICARE

## 2021-05-13 ENCOUNTER — HOSPITAL ENCOUNTER (OUTPATIENT)
Dept: RADIOLOGY | Facility: MEDICAL CENTER | Age: 71
End: 2021-05-13
Attending: FAMILY MEDICINE
Payer: MEDICARE

## 2021-05-13 DIAGNOSIS — Z12.31 VISIT FOR SCREENING MAMMOGRAM: ICD-10-CM

## 2021-05-13 PROCEDURE — 77063 BREAST TOMOSYNTHESIS BI: CPT

## 2021-10-19 ENCOUNTER — OFFICE VISIT (OUTPATIENT)
Dept: MEDICAL GROUP | Facility: MEDICAL CENTER | Age: 71
End: 2021-10-19
Payer: MEDICARE

## 2021-10-19 VITALS
HEART RATE: 86 BPM | OXYGEN SATURATION: 96 % | WEIGHT: 150 LBS | SYSTOLIC BLOOD PRESSURE: 126 MMHG | HEIGHT: 62 IN | TEMPERATURE: 97.1 F | BODY MASS INDEX: 27.6 KG/M2 | DIASTOLIC BLOOD PRESSURE: 72 MMHG

## 2021-10-19 DIAGNOSIS — H93.8X1 EAR FULLNESS, RIGHT: ICD-10-CM

## 2021-10-19 PROCEDURE — 69210 REMOVE IMPACTED EAR WAX UNI: CPT | Performed by: FAMILY MEDICINE

## 2021-10-19 ASSESSMENT — PATIENT HEALTH QUESTIONNAIRE - PHQ9: CLINICAL INTERPRETATION OF PHQ2 SCORE: 0

## 2021-10-19 NOTE — ASSESSMENT & PLAN NOTE
New problem. X 1 mo  Feels full and not heraing as well out of that side.   No allergies  No tinnitus or dizzines

## 2021-10-21 NOTE — PROGRESS NOTES
"Subjective:     CC: The encounter diagnosis was Ear fullness, right.    HPI: Patient is a 71 y.o. female established patient who presents today with concern regarding ear fullness.       Ear fullness, right  New problem. X 1 mo  Feels full and not heraing as well out of that side.   No allergies  No tinnitus or dizzines      Past Medical History:   Diagnosis Date   • Endometriosis of ovary    • Osteopenia 12/9/2013       Social History     Tobacco Use   • Smoking status: Never Smoker   • Smokeless tobacco: Never Used   Vaping Use   • Vaping Use: Never used   Substance Use Topics   • Alcohol use: Yes     Alcohol/week: 3.0 - 4.2 oz     Types: 5 - 7 Glasses of wine per week     Comment: one glass of wine   • Drug use: No       Current Outpatient Medications Ordered in Epic   Medication Sig Dispense Refill   • Diclofenac Sodium 1 % Gel Apply thin layer to affected joint qid prn pain. 1 Tube 2   • vitamin D (CHOLECALCIFEROL) 1000 UNIT TABS Take 1,000 Units by mouth every day.       No current [facility]-ordered facility-administered medications on file.       Allergies:  Patient has no known allergies.    Health Maintenance: Completed    ROS:  Pulm: no sob, no cough  CV: no chest pain, no palpitations    Objective:       Exam:  /72 (BP Location: Right arm, Patient Position: Sitting, BP Cuff Size: Adult long)   Pulse 86   Temp 36.2 °C (97.1 °F) (Temporal)   Ht 1.575 m (5' 2\")   Wt 68 kg (150 lb)   SpO2 96%   BMI 27.44 kg/m²  Body mass index is 27.44 kg/m².    General: Normal appearing. No distress.  HEAD: NCAT  EYES: conjunctiva clear, lids without ptosis, pupils equal and reactive to light  EARS: ears normal shape and contour. B/l TMs obstructed with cerumen.   MOUTH: normal dentition   Neck:  Normal ROM  Pulmonary: Normal effort. Normal respiratory rate.  Cardiovascular: Well perfused. No LE edema  Neurologic: Grossly normal, no focal deficits  Skin: Warm and dry.  No obvious lesions.  Musculoskeletal: Normal gait " and station.   Psych: Normal mood and affect. Alert and oriented x3. Judgment and insight is normal.      Labs: 3/25/21 Results reviewed and discussed with the patient, questions answered.    Assessment & Plan:     71 y.o. female with the following -     1. Ear fullness, right  New problem. Patient was found to have bilateral cerumen. L canal curetted by myself successful. Lavage done on R and then further curettage required on the right. Patient tolerated procedure well. TMs were clearn.       No follow-ups on file.    Please note that this dictation was created using voice recognition software. I have made every reasonable attempt to correct obvious errors, but I expect that there are errors of grammar and possibly content that I did not discover before finalizing the note.

## 2022-01-03 ENCOUNTER — OFFICE VISIT (OUTPATIENT)
Dept: MEDICAL GROUP | Facility: MEDICAL CENTER | Age: 72
End: 2022-01-03
Payer: MEDICARE

## 2022-01-03 VITALS
TEMPERATURE: 97.4 F | HEART RATE: 85 BPM | WEIGHT: 153.8 LBS | BODY MASS INDEX: 28.3 KG/M2 | OXYGEN SATURATION: 99 % | HEIGHT: 62 IN | DIASTOLIC BLOOD PRESSURE: 68 MMHG | SYSTOLIC BLOOD PRESSURE: 122 MMHG

## 2022-01-03 DIAGNOSIS — E55.9 VITAMIN D DEFICIENCY: ICD-10-CM

## 2022-01-03 DIAGNOSIS — E66.3 OVERWEIGHT: ICD-10-CM

## 2022-01-03 DIAGNOSIS — E78.00 PURE HYPERCHOLESTEROLEMIA: ICD-10-CM

## 2022-01-03 DIAGNOSIS — Z12.11 SCREENING FOR COLON CANCER: ICD-10-CM

## 2022-01-03 DIAGNOSIS — M85.80 OSTEOPENIA, UNSPECIFIED LOCATION: ICD-10-CM

## 2022-01-03 DIAGNOSIS — R79.89 LOW VITAMIN D LEVEL: ICD-10-CM

## 2022-01-03 DIAGNOSIS — Z11.59 ENCOUNTER FOR HEPATITIS C SCREENING TEST FOR LOW RISK PATIENT: ICD-10-CM

## 2022-01-03 PROBLEM — H93.8X1 EAR FULLNESS, RIGHT: Status: RESOLVED | Noted: 2019-12-31 | Resolved: 2022-01-03

## 2022-01-03 PROCEDURE — G0439 PPPS, SUBSEQ VISIT: HCPCS | Performed by: FAMILY MEDICINE

## 2022-01-03 ASSESSMENT — ENCOUNTER SYMPTOMS: GENERAL WELL-BEING: EXCELLENT

## 2022-01-03 ASSESSMENT — ACTIVITIES OF DAILY LIVING (ADL): BATHING_REQUIRES_ASSISTANCE: 0

## 2022-01-03 ASSESSMENT — PATIENT HEALTH QUESTIONNAIRE - PHQ9: CLINICAL INTERPRETATION OF PHQ2 SCORE: 0

## 2022-01-03 NOTE — ASSESSMENT & PLAN NOTE
Chronic problem. Last lipid panel done march 2021. ASCVD risk 7.8%  Component      Latest Ref Rng & Units 3/25/2021           6:43 AM   Cholesterol,Tot      100 - 199 mg/dL 233 (H)   Triglycerides      0 - 149 mg/dL 111   HDL      >=40 mg/dL 68   LDL      <100 mg/dL 143 (H)

## 2022-01-03 NOTE — PROGRESS NOTES
Chief Complaint   Patient presents with   • Annual Wellness Visit   • Referral Needed     colo         HPI:  Iesha is a 71 y.o. here for Medicare Annual Wellness Visit    Vitamin D deficiency  Chronic problem.  Patient reports that she is taking vit D regularly.  She does have history of osteopenia.  Up-to-date on bone density test.    Overweight  Reports mostly healthy diet  Walking daily  Does ride her bike ever now and then.   She does report that there is some improvements she can make with her diet, interested in losing around 10 pounds.    Pure hypercholesterolemia  Chronic problem. Last lipid panel done march 2021. ASCVD risk 7.8%  Component      Latest Ref Rng & Units 3/25/2021           6:43 AM   Cholesterol,Tot      100 - 199 mg/dL 233 (H)   Triglycerides      0 - 149 mg/dL 111   HDL      >=40 mg/dL 68   LDL      <100 mg/dL 143 (H)     Patient Active Problem List    Diagnosis Date Noted   • Overweight 01/03/2022   • Pure hypercholesterolemia 12/17/2020   • Vitamin D deficiency 12/12/2013   • Osteopenia 12/09/2013       Current Outpatient Medications   Medication Sig Dispense Refill   • vitamin D (CHOLECALCIFEROL) 1000 UNIT TABS Take 1,000 Units by mouth every day.       No current facility-administered medications for this visit.        Patient is taking medications as noted in medication list.  Current supplements as per medication list.     Allergies: Patient has no known allergies.    Current social contact/activities: Family/friends    Is patient current with immunizations? Yes.    She  reports that she has never smoked. She has never used smokeless tobacco. She reports current alcohol use of about 3.0 - 4.2 oz of alcohol per week. She reports that she does not use drugs.  Counseling given: Not Answered        DPA/Advanced directive: Patient has Living Will, but it is not on file. Instructed to bring in a copy to scan into their chart.    ROS:    Gait: Uses no assistive device   Ostomy: No   Other  tubes: No   Amputations: No   Chronic oxygen use No   Last eye exam: cataract surgery in summer 2021   Wears hearing aids: No   : Denies any urinary leakage during the last 6 months      Screening:        Depression Screening    Little interest or pleasure in doing things?  0 - not at all  Feeling down, depressed, or hopeless? 0 - not at all  Patient Health Questionnaire Score: 0    If depressive symptoms identified deferred to follow up visit unless specifically addressed in assessment and plan.    Interpretation of PHQ-9 Total Score   Score Severity   1-4 No Depression   5-9 Mild Depression   10-14 Moderate Depression   15-19 Moderately Severe Depression   20-27 Severe Depression    Screening for Cognitive Impairment    Three Minute Recall (captain, garden, picture)  3/3    Alexis clock face with all 12 numbers and set the hands to show 5 past 8.  Yes    If cognitive concerns identified, deferred for follow up unless specifically addressed in assessment and plan.    Fall Risk Assessment    Has the patient had two or more falls in the last year or any fall with injury in the last year?  No  If fall risk identified, deferred for follow up unless specifically addressed in assessment and plan.    Safety Assessment    Throw rugs on floor.  Yes  Handrails on all stairs.  Yes  Good lighting in all hallways.  Yes  Difficulty hearing.  No  Patient counseled about all safety risks that were identified.    Functional Assessment ADLs    Are there any barriers preventing you from cooking for yourself or meeting nutritional needs?  No.    Are there any barriers preventing you from driving safely or obtaining transportation?  No.    Are there any barriers preventing you from using a telephone or calling for help?  No.    Are there any barriers preventing you from shopping?  No.    Are there any barriers preventing you from taking care of your own finances?  No.    Are there any barriers preventing you from managing your  medications?  No.    Are there any barriers preventing you from showering, bathing or dressing yourself?  No.    Are you currently engaging in any exercise or physical activity?  Yes.  Walking- 7,000-10,0000 steps a day   What is your perception of your health?  Excellent.    Health Maintenance Summary          Overdue - Annual Wellness Visit (Every 366 Days) Overdue - never done    No completion history exists for this topic.          Ordered - HEPATITIS C SCREENING (Once) Ordered on 1/3/2022    No completion history exists for this topic.          Overdue - IMM ZOSTER VACCINES (2 of 3) Overdue since 11/7/2014 09/12/2014  Imm Admin: Zoster Vaccine Live (ZVL) (Zostavax) - HISTORICAL DATA          Overdue - IMM PNEUMOCOCCAL VACCINE: 65+ Years (2 of 2 - PPSV23) Overdue since 12/31/2020 12/31/2019  Imm Admin: Pneumococcal Conjugate Vaccine (Prevnar/PCV-13)          Overdue - IMM INFLUENZA (1) Overdue since 9/1/2021 12/17/2020  Imm Admin: Influenza Vaccine Adult HD    10/31/2019  Imm Admin: Influenza Vaccine Adult HD    10/11/2018  Imm Admin: Influenza, Unspecified - HISTORICAL DATA    10/26/2017  Imm Admin: Influenza Vaccine Adult HD    10/08/2016  Imm Admin: Influenza (IM) Preservative Free - HISTORICAL DATA    Only the first 5 history entries have been loaded, but more history exists.          MAMMOGRAM (Yearly) Tentatively due on 5/13/2022 05/13/2021  MA-SCREENING MAMMO BILAT W/TOMOSYNTHESIS W/CAD    02/19/2020  MA-SCREENING MAMMO BILAT W/TOMOSYNTHESIS W/CAD    09/26/2018  MA-SCREENING MAMMO BILAT W/TOMOSYNTHESIS W/CAD    08/08/2017  MA-MAMMO SCREENING BILAT W/JOSE W/CAD    04/28/2016  MA-SCREEN MAMMO W/CAD-BILAT    Only the first 5 history entries have been loaded, but more history exists.          Ordered - COLORECTAL CANCER SCREENING (COLONOSCOPY - Every 10 Years) Ordered on 1/3/2022    12/29/2012  COLONOSCOPY (Reason not specified)          BONE DENSITY (Every 5 Years) Tentatively due on 2/22/2023  "   02/22/2018  DS-BONE DENSITY STUDY (DEXA)    01/28/2014  DS-BONE DENSITY STUDY (DEXA)          IMM DTaP/Tdap/Td Vaccine (2 - Td or Tdap) Next due on 12/29/2024 12/29/2014  Imm Admin: Tdap Vaccine          COVID-19 Vaccine (Series Information) Completed    12/21/2021  Imm Admin: Pfizer SARS-CoV-2 Vaccine 12+    03/25/2021  Imm Admin: Pfizer SARS-CoV-2 Vaccine    03/04/2021  Imm Admin: Pfizer SARS-CoV-2 Vaccine          IMM HEP B VACCINE (Series Information) Aged Out    No completion history exists for this topic.          IMM MENINGOCOCCAL VACCINE (MCV4) (Series Information) Aged Out    No completion history exists for this topic.          Discontinued - PAP SMEAR  Discontinued    12/29/2012  Reason not specified                Patient Care Team:  Nelly Mcbride M.D. as PCP - General (Family Medicine)    Social History     Tobacco Use   • Smoking status: Never Smoker   • Smokeless tobacco: Never Used   Vaping Use   • Vaping Use: Never used   Substance Use Topics   • Alcohol use: Yes     Alcohol/week: 3.0 - 4.2 oz     Types: 5 - 7 Glasses of wine per week     Comment: one glass of wine   • Drug use: No     Family History   Problem Relation Age of Onset   • Heart Disease Mother    • Stroke Mother    • Hypertension Mother    • Hyperlipidemia Mother    • Cancer Mother         breast     She  has a past medical history of Endometriosis of ovary and Osteopenia (12/9/2013).   Past Surgical History:   Procedure Laterality Date   • OOPHORECTOMY  1980    partial   • TONSILLECTOMY             Exam:     /68 (BP Location: Left arm, Patient Position: Sitting, BP Cuff Size: Adult long)   Pulse 85   Temp 36.3 °C (97.4 °F) (Temporal)   Ht 1.575 m (5' 2\")   Wt 69.8 kg (153 lb 12.8 oz)   SpO2 99%  Body mass index is 28.13 kg/m².    Hearing good.    Dentition good  Alert, oriented in no acute distress.  Eye contact is good, speech goal directed, affect calm      Assessment and Plan. The following treatment and " monitoring plan is recommended:    1. Screening for colon cancer  Referral to GI for Colonoscopy   2. Encounter for hepatitis C screening test for low risk patient  HEP C VIRUS ANTIBODY   3. Low vitamin D level  VITAMIN D,25 HYDROXY   4. Pure hypercholesterolemia  Lipid Profile   5. Vitamin D deficiency     6. Overweight       Reviewed labs done in March.  Patient is generally doing quite well.  She is due for pneumonia vaccine unfortunately, we do not have that in the office today.  She is due for colonoscopy, referral placed today.  Annual labs ordered for March.  Up-to-date on bone density and mammogram.  Reviewed healthy diet and exercise at length.    Services suggested: No services needed at this time  Health Care Screening recommendations as per orders if indicated.  Referrals offered: PT/OT/Nutrition counseling/Behavioral Health/Smoking cessation as per orders if indicated.    Discussion today about general wellness and lifestyle habits:    · Prevent falls and reduce trip hazards; Cautioned about securing or removing rugs.  · Have a working fire alarm and carbon monoxide detector;   · Engage in regular physical activity and social activities       Follow-up: No follow-ups on file.

## 2022-03-11 ENCOUNTER — OFFICE VISIT (OUTPATIENT)
Dept: MEDICAL GROUP | Facility: MEDICAL CENTER | Age: 72
End: 2022-03-11
Payer: MEDICARE

## 2022-03-11 VITALS
HEIGHT: 62 IN | TEMPERATURE: 96.5 F | BODY MASS INDEX: 27.49 KG/M2 | SYSTOLIC BLOOD PRESSURE: 110 MMHG | HEART RATE: 76 BPM | OXYGEN SATURATION: 97 % | WEIGHT: 149.4 LBS | DIASTOLIC BLOOD PRESSURE: 74 MMHG

## 2022-03-11 DIAGNOSIS — M25.552 CHRONIC HIP PAIN, BILATERAL: ICD-10-CM

## 2022-03-11 DIAGNOSIS — G89.29 CHRONIC HIP PAIN, BILATERAL: ICD-10-CM

## 2022-03-11 DIAGNOSIS — M25.561 CHRONIC PAIN OF RIGHT KNEE: ICD-10-CM

## 2022-03-11 DIAGNOSIS — M25.551 CHRONIC HIP PAIN, BILATERAL: ICD-10-CM

## 2022-03-11 DIAGNOSIS — G89.29 CHRONIC PAIN OF RIGHT KNEE: ICD-10-CM

## 2022-03-11 PROCEDURE — 99214 OFFICE O/P EST MOD 30 MIN: CPT | Performed by: PHYSICIAN ASSISTANT

## 2022-03-11 NOTE — ASSESSMENT & PLAN NOTE
Initially started over the summer. No specific injury. Swollen, a little warm and painful. Took few weeks to go away.    Now for a week same thing is happened. Again no specific injury. Swelling, warm, hurts to walk. Takes ibuprofen and bought a compression sleeve but hasn't used it yet.    May have overdid it with walking when she had family visiting and this caused the problem    No popping or locking or getting stuck    Going up the stairs hurts more than going down the stairs    Stationary bike is not painful    No previous evaluation for knee pain.

## 2022-03-11 NOTE — ASSESSMENT & PLAN NOTE
Multiple months. Not all the time. Mild pain. Sides of hips. Not in groin. No popping or locking sensation. Worse when she lies down on her side.

## 2022-03-13 NOTE — PROGRESS NOTES
Subjective     Iesha Galvan is a 71 y.o. female who presents with Knee Pain ((R) knee, swollen & tender, requesting a referral)          Chief Complaint   Patient presents with   • Knee Pain     (R) knee, swollen & tender, requesting a referral       HPI Chronic pain of right knee  Initially started over the summer. No specific injury. Swollen, a little warm and painful. Took few weeks to go away.    Now for a week same thing is happened. Again no specific injury. Swelling, warm, hurts to walk. Takes ibuprofen and bought a compression sleeve but hasn't used it yet.    May have overdid it with walking when she had family visiting and this caused the problem    No popping or locking or getting stuck    Going up the stairs hurts more than going down the stairs    Stationary bike is not painful    No previous evaluation for knee pain.    Chronic hip pain, bilateral  Multiple months. Not all the time. Mild pain. Sides of hips. Not in groin. No popping or locking sensation. Worse when she lies down on her side.      ROSno weight change. No fever/chills. No rash or skin lesion. No other joint concerns        Active Ambulatory Problems     Diagnosis Date Noted   • Osteopenia 12/09/2013   • Vitamin D deficiency 12/12/2013   • Pure hypercholesterolemia 12/17/2020   • Overweight 01/03/2022   • Chronic pain of right knee 03/11/2022   • Chronic hip pain, bilateral 03/11/2022     Resolved Ambulatory Problems     Diagnosis Date Noted   • B12 deficiency 12/12/2013   • Cervical radiculopathy due to degenerative joint disease of spine 11/01/2018   • Ear fullness, right 12/31/2019   • Pain in finger of both hands 12/31/2019   • Fall 07/16/2020     Past Medical History:   Diagnosis Date   • Endometriosis of ovary      Current Outpatient Medications   Medication Sig Dispense Refill   • vitamin D (CHOLECALCIFEROL) 1000 UNIT TABS Take 1,000 Units by mouth every day.       No current facility-administered medications for this visit.  "  nkda  Non-smoker      Objective     /74 (BP Location: Left arm, Patient Position: Sitting, BP Cuff Size: Adult long)   Pulse 76   Temp 35.8 °C (96.5 °F) (Temporal)   Ht 1.575 m (5' 2\")   Wt 67.8 kg (149 lb 6.4 oz)   SpO2 97%   Breastfeeding No   BMI 27.33 kg/m²      Physical Exam  Vitals and nursing note reviewed.   Constitutional:       General: She is not in acute distress.     Appearance: Normal appearance. She is not ill-appearing, toxic-appearing or diaphoretic.   Musculoskeletal:      Right knee: Effusion present. No deformity, erythema or crepitus. Normal range of motion. No LCL laxity, MCL laxity, ACL laxity or PCL laxity. Normal alignment, normal meniscus and normal patellar mobility.        Legs:       Comments: Mild effusion, normal extention and flexion without pain   Skin:     General: Skin is warm and dry.      Coloration: Skin is not pale.      Findings: No rash.   Neurological:      General: No focal deficit present.      Mental Status: She is alert and oriented to person, place, and time.   Psychiatric:         Mood and Affect: Mood normal.         Behavior: Behavior normal.         Thought Content: Thought content normal.         Judgment: Judgment normal.                             Assessment & Plan        1. Chronic pain of right knee    - DX-KNEE 3 VIEWS RIGHT; Future    2. Chronic hip pain, bilateral    - DX-HIP-BILATERAL-WITH PELVIS-2 VIEWS; Future    Discussed with patient that I think the right knee pain is secondary to acute inflammation of chronic arthritis. Xray then f/u with Dr. Mcbride    I think the hip pain may be bursitis given location and symptoms, xray then f/u with Dr. Mcbride    Discussed that I do not think she needs a referral at this time, however if she fails conservative treatment and/or new symptoms develop we can certainly do a referral to ortho       oral ibuprofen, ice knee, compression sleeve, avoid high impact exercise, otc voltaren gel    Consider " PT

## 2022-03-14 ENCOUNTER — HOSPITAL ENCOUNTER (OUTPATIENT)
Dept: RADIOLOGY | Facility: MEDICAL CENTER | Age: 72
End: 2022-03-14
Attending: PHYSICIAN ASSISTANT
Payer: MEDICARE

## 2022-03-14 DIAGNOSIS — G89.29 CHRONIC HIP PAIN, BILATERAL: ICD-10-CM

## 2022-03-14 DIAGNOSIS — M25.552 CHRONIC HIP PAIN, BILATERAL: ICD-10-CM

## 2022-03-14 DIAGNOSIS — M25.551 CHRONIC HIP PAIN, BILATERAL: ICD-10-CM

## 2022-03-14 DIAGNOSIS — G89.29 CHRONIC PAIN OF RIGHT KNEE: ICD-10-CM

## 2022-03-14 DIAGNOSIS — M25.561 CHRONIC PAIN OF RIGHT KNEE: ICD-10-CM

## 2022-03-14 PROCEDURE — 73521 X-RAY EXAM HIPS BI 2 VIEWS: CPT

## 2022-03-14 PROCEDURE — 73562 X-RAY EXAM OF KNEE 3: CPT | Mod: RT

## 2022-03-15 ENCOUNTER — OFFICE VISIT (OUTPATIENT)
Dept: MEDICAL GROUP | Facility: MEDICAL CENTER | Age: 72
End: 2022-03-15
Payer: MEDICARE

## 2022-03-15 VITALS
TEMPERATURE: 97.4 F | SYSTOLIC BLOOD PRESSURE: 124 MMHG | WEIGHT: 149 LBS | OXYGEN SATURATION: 98 % | HEIGHT: 62 IN | HEART RATE: 75 BPM | BODY MASS INDEX: 27.42 KG/M2 | DIASTOLIC BLOOD PRESSURE: 72 MMHG

## 2022-03-15 DIAGNOSIS — G89.29 CHRONIC HIP PAIN, BILATERAL: ICD-10-CM

## 2022-03-15 DIAGNOSIS — G89.29 CHRONIC PAIN OF RIGHT KNEE: ICD-10-CM

## 2022-03-15 DIAGNOSIS — M25.561 CHRONIC PAIN OF RIGHT KNEE: ICD-10-CM

## 2022-03-15 DIAGNOSIS — M25.552 CHRONIC HIP PAIN, BILATERAL: ICD-10-CM

## 2022-03-15 DIAGNOSIS — M25.551 CHRONIC HIP PAIN, BILATERAL: ICD-10-CM

## 2022-03-15 PROCEDURE — 99213 OFFICE O/P EST LOW 20 MIN: CPT | Performed by: FAMILY MEDICINE

## 2022-03-15 NOTE — ASSESSMENT & PLAN NOTE
Feeling better this week.   Thinks she may have overdone it prior to her last visit.   She recently started riding her stationary bike, that seems to be helping.

## 2022-03-17 NOTE — PROGRESS NOTES
"Subjective:     CC: Diagnoses of Chronic pain of right knee and Chronic hip pain, bilateral were pertinent to this visit.    HPI: Patient is a 71 y.o. female established patient who presents today to follow up on knee pain.       Chronic pain of right knee  Feeling better this week.   Thinks she may have overdone it prior to her last visit.   She recently started riding her stationary bike, that seems to be helping.   Declines referral to PT.   No need for medications.     Chronic hip pain, bilateral  Seems pretty mild. Mostly bothersome at night. Again, declines referral to PT or meds.       Past Medical History:   Diagnosis Date   • Endometriosis of ovary    • Osteopenia 12/9/2013       Social History     Tobacco Use   • Smoking status: Never Smoker   • Smokeless tobacco: Never Used   Vaping Use   • Vaping Use: Never used   Substance Use Topics   • Alcohol use: Yes     Alcohol/week: 3.0 - 4.2 oz     Types: 5 - 7 Glasses of wine per week     Comment: one glass of wine   • Drug use: No       Current Outpatient Medications Ordered in Epic   Medication Sig Dispense Refill   • vitamin D (CHOLECALCIFEROL) 1000 UNIT TABS Take 1,000 Units by mouth every day.       No current [facility]-ordered facility-administered medications on file.       Allergies:  Patient has no known allergies.    Health Maintenance: Completed      Objective:       Exam:  /72 (BP Location: Right arm, Patient Position: Sitting, BP Cuff Size: Adult long)   Pulse 75   Temp 36.3 °C (97.4 °F) (Temporal)   Ht 1.575 m (5' 2\")   Wt 67.6 kg (149 lb)   SpO2 98%   BMI 27.25 kg/m²  Body mass index is 27.25 kg/m².    General: Normal appearing. No distress.  HEAD: NCAT  EYES: conjunctiva clear, lids without ptosis, pupils equal and reactive to light  EARS: ears normal shape and contour.  MOUTH: normal dentition   Neck:  Normal ROM  Pulmonary: Normal effort. Normal respiratory rate.  Cardiovascular: Well perfused. No LE edema  Neurologic: Grossly normal, no " focal deficits  Skin: Warm and dry.  No obvious lesions.  Musculoskeletal: Normal gait and station.   Psych: Normal mood and affect. Alert and oriented x3. Judgment and insight is normal.     Labs: 3/25/21 Results reviewed and discussed with the patient, questions answered.    Assessment & Plan:     71 y.o. female with the following -     1. Chronic pain of right knee  Patient reports significant improvement since last week.  No need for injections or referral today.  Declines PT medications.    2. Chronic hip pain, bilateral  Chronic problem, mild, plan to hold off on any intervention for now.    Encourage patient to get yearly labs done.    No follow-ups on file.    Please note that this dictation was created using voice recognition software. I have made every reasonable attempt to correct obvious errors, but I expect that there are errors of grammar and possibly content that I did not discover before finalizing the note.

## 2022-04-14 ENCOUNTER — HOSPITAL ENCOUNTER (OUTPATIENT)
Dept: LAB | Facility: MEDICAL CENTER | Age: 72
End: 2022-04-14
Attending: FAMILY MEDICINE
Payer: MEDICARE

## 2022-04-14 DIAGNOSIS — R79.89 LOW VITAMIN D LEVEL: ICD-10-CM

## 2022-04-14 DIAGNOSIS — Z11.59 ENCOUNTER FOR HEPATITIS C SCREENING TEST FOR LOW RISK PATIENT: ICD-10-CM

## 2022-04-14 DIAGNOSIS — E78.00 PURE HYPERCHOLESTEROLEMIA: ICD-10-CM

## 2022-04-14 LAB
25(OH)D3 SERPL-MCNC: 39 NG/ML (ref 30–100)
CHOLEST SERPL-MCNC: 224 MG/DL (ref 100–199)
FASTING STATUS PATIENT QL REPORTED: NORMAL
HCV AB SER QL: NORMAL
HDLC SERPL-MCNC: 73 MG/DL
LDLC SERPL CALC-MCNC: 130 MG/DL
TRIGL SERPL-MCNC: 103 MG/DL (ref 0–149)

## 2022-04-14 PROCEDURE — 36415 COLL VENOUS BLD VENIPUNCTURE: CPT

## 2022-04-14 PROCEDURE — 82306 VITAMIN D 25 HYDROXY: CPT | Mod: GA

## 2022-04-14 PROCEDURE — 80061 LIPID PANEL: CPT

## 2022-04-14 PROCEDURE — 86803 HEPATITIS C AB TEST: CPT

## 2022-08-12 ENCOUNTER — HOSPITAL ENCOUNTER (OUTPATIENT)
Dept: RADIOLOGY | Facility: MEDICAL CENTER | Age: 72
End: 2022-08-12
Attending: FAMILY MEDICINE
Payer: MEDICARE

## 2022-08-12 DIAGNOSIS — Z12.31 VISIT FOR SCREENING MAMMOGRAM: ICD-10-CM

## 2022-08-12 PROCEDURE — 77063 BREAST TOMOSYNTHESIS BI: CPT

## 2022-09-06 ENCOUNTER — OFFICE VISIT (OUTPATIENT)
Dept: MEDICAL GROUP | Facility: MEDICAL CENTER | Age: 72
End: 2022-09-06
Payer: MEDICARE

## 2022-09-06 VITALS
WEIGHT: 152.12 LBS | SYSTOLIC BLOOD PRESSURE: 128 MMHG | BODY MASS INDEX: 27.99 KG/M2 | DIASTOLIC BLOOD PRESSURE: 72 MMHG | HEART RATE: 80 BPM | TEMPERATURE: 97 F | HEIGHT: 62 IN | OXYGEN SATURATION: 97 % | RESPIRATION RATE: 16 BRPM

## 2022-09-06 DIAGNOSIS — H93.8X2 CONGESTION OF LEFT EAR: ICD-10-CM

## 2022-09-06 PROCEDURE — 99213 OFFICE O/P EST LOW 20 MIN: CPT | Performed by: PHYSICIAN ASSISTANT

## 2022-09-06 RX ORDER — PREDNISONE 20 MG/1
20 TABLET ORAL DAILY
Qty: 3 TABLET | Refills: 0 | Status: SHIPPED | OUTPATIENT
Start: 2022-09-06 | End: 2023-03-03

## 2022-09-06 NOTE — PROGRESS NOTES
"Chief Complaint   Patient presents with    Ear Pain     L X  1 week        HPI  Iesha Galvan is a 72 y.o. female here today for left-sided ear pain for about a week.  Denies any recent swimming, no otorrhea, no change in hearing.  Feels pressure and pain, no sore throat            Exam:  /72 (BP Location: Left arm, Patient Position: Sitting)   Pulse 80   Temp 36.1 °C (97 °F) (Temporal)   Resp 16   Ht 1.575 m (5' 2\")   Wt 69 kg (152 lb 1.9 oz)   SpO2 97%       Constitutional: Alert, oriented in no acute distress.  Psych: Eye contact is good, speech goal directed, affect calm  Eyes: Conjunctiva non-injected, sclera non-icteric.  Ears:  External ears unremarkable. TMs pearly gray, clear and intact, w/o any perforation or effusion.        A/P:  1. Congestion of left ear  Discussed Flonase and Sudafed  Patient is traveling tomorrow with car  - predniSONE (DELTASONE) 20 MG Tab; Take 1 Tablet by mouth every day.  Dispense: 3 Tablet; Refill: 0        F/U: prn     "

## 2022-11-09 ENCOUNTER — PATIENT MESSAGE (OUTPATIENT)
Dept: HEALTH INFORMATION MANAGEMENT | Facility: OTHER | Age: 72
End: 2022-11-09

## 2023-02-28 SDOH — ECONOMIC STABILITY: HOUSING INSECURITY
IN THE LAST 12 MONTHS, WAS THERE A TIME WHEN YOU DID NOT HAVE A STEADY PLACE TO SLEEP OR SLEPT IN A SHELTER (INCLUDING NOW)?: NO

## 2023-02-28 SDOH — ECONOMIC STABILITY: INCOME INSECURITY: IN THE LAST 12 MONTHS, WAS THERE A TIME WHEN YOU WERE NOT ABLE TO PAY THE MORTGAGE OR RENT ON TIME?: NO

## 2023-02-28 SDOH — ECONOMIC STABILITY: INCOME INSECURITY: HOW HARD IS IT FOR YOU TO PAY FOR THE VERY BASICS LIKE FOOD, HOUSING, MEDICAL CARE, AND HEATING?: NOT HARD AT ALL

## 2023-02-28 SDOH — ECONOMIC STABILITY: TRANSPORTATION INSECURITY
IN THE PAST 12 MONTHS, HAS THE LACK OF TRANSPORTATION KEPT YOU FROM MEDICAL APPOINTMENTS OR FROM GETTING MEDICATIONS?: NO

## 2023-02-28 SDOH — HEALTH STABILITY: PHYSICAL HEALTH: ON AVERAGE, HOW MANY MINUTES DO YOU ENGAGE IN EXERCISE AT THIS LEVEL?: 50 MIN

## 2023-02-28 SDOH — ECONOMIC STABILITY: TRANSPORTATION INSECURITY
IN THE PAST 12 MONTHS, HAS LACK OF TRANSPORTATION KEPT YOU FROM MEETINGS, WORK, OR FROM GETTING THINGS NEEDED FOR DAILY LIVING?: NO

## 2023-02-28 SDOH — ECONOMIC STABILITY: HOUSING INSECURITY: IN THE LAST 12 MONTHS, HOW MANY PLACES HAVE YOU LIVED?: 1

## 2023-02-28 SDOH — HEALTH STABILITY: PHYSICAL HEALTH: ON AVERAGE, HOW MANY DAYS PER WEEK DO YOU ENGAGE IN MODERATE TO STRENUOUS EXERCISE (LIKE A BRISK WALK)?: 4 DAYS

## 2023-02-28 SDOH — ECONOMIC STABILITY: FOOD INSECURITY: WITHIN THE PAST 12 MONTHS, YOU WORRIED THAT YOUR FOOD WOULD RUN OUT BEFORE YOU GOT MONEY TO BUY MORE.: NEVER TRUE

## 2023-02-28 SDOH — ECONOMIC STABILITY: FOOD INSECURITY: WITHIN THE PAST 12 MONTHS, THE FOOD YOU BOUGHT JUST DIDN'T LAST AND YOU DIDN'T HAVE MONEY TO GET MORE.: NEVER TRUE

## 2023-02-28 SDOH — ECONOMIC STABILITY: TRANSPORTATION INSECURITY
IN THE PAST 12 MONTHS, HAS LACK OF RELIABLE TRANSPORTATION KEPT YOU FROM MEDICAL APPOINTMENTS, MEETINGS, WORK OR FROM GETTING THINGS NEEDED FOR DAILY LIVING?: NO

## 2023-02-28 SDOH — HEALTH STABILITY: MENTAL HEALTH
STRESS IS WHEN SOMEONE FEELS TENSE, NERVOUS, ANXIOUS, OR CAN'T SLEEP AT NIGHT BECAUSE THEIR MIND IS TROUBLED. HOW STRESSED ARE YOU?: ONLY A LITTLE

## 2023-02-28 ASSESSMENT — LIFESTYLE VARIABLES
AUDIT-C TOTAL SCORE: 4
HOW MANY STANDARD DRINKS CONTAINING ALCOHOL DO YOU HAVE ON A TYPICAL DAY: 1 OR 2
HOW OFTEN DO YOU HAVE A DRINK CONTAINING ALCOHOL: 4 OR MORE TIMES A WEEK
HOW OFTEN DO YOU HAVE SIX OR MORE DRINKS ON ONE OCCASION: NEVER
SKIP TO QUESTIONS 9-10: 1

## 2023-02-28 ASSESSMENT — SOCIAL DETERMINANTS OF HEALTH (SDOH)
HOW OFTEN DO YOU ATTEND CHURCH OR RELIGIOUS SERVICES?: 1 TO 4 TIMES PER YEAR
HOW HARD IS IT FOR YOU TO PAY FOR THE VERY BASICS LIKE FOOD, HOUSING, MEDICAL CARE, AND HEATING?: NOT HARD AT ALL
HOW OFTEN DO YOU HAVE A DRINK CONTAINING ALCOHOL: 4 OR MORE TIMES A WEEK
IN A TYPICAL WEEK, HOW MANY TIMES DO YOU TALK ON THE PHONE WITH FAMILY, FRIENDS, OR NEIGHBORS?: MORE THAN THREE TIMES A WEEK
WITHIN THE PAST 12 MONTHS, YOU WORRIED THAT YOUR FOOD WOULD RUN OUT BEFORE YOU GOT THE MONEY TO BUY MORE: NEVER TRUE
HOW OFTEN DO YOU ATTENT MEETINGS OF THE CLUB OR ORGANIZATION YOU BELONG TO?: MORE THAN 4 TIMES PER YEAR
HOW MANY DRINKS CONTAINING ALCOHOL DO YOU HAVE ON A TYPICAL DAY WHEN YOU ARE DRINKING: 1 OR 2
HOW OFTEN DO YOU ATTEND CHURCH OR RELIGIOUS SERVICES?: 1 TO 4 TIMES PER YEAR
IN A TYPICAL WEEK, HOW MANY TIMES DO YOU TALK ON THE PHONE WITH FAMILY, FRIENDS, OR NEIGHBORS?: MORE THAN THREE TIMES A WEEK
HOW OFTEN DO YOU HAVE SIX OR MORE DRINKS ON ONE OCCASION: NEVER
DO YOU BELONG TO ANY CLUBS OR ORGANIZATIONS SUCH AS CHURCH GROUPS UNIONS, FRATERNAL OR ATHLETIC GROUPS, OR SCHOOL GROUPS?: YES
HOW OFTEN DO YOU ATTENT MEETINGS OF THE CLUB OR ORGANIZATION YOU BELONG TO?: MORE THAN 4 TIMES PER YEAR
HOW OFTEN DO YOU GET TOGETHER WITH FRIENDS OR RELATIVES?: THREE TIMES A WEEK
HOW OFTEN DO YOU GET TOGETHER WITH FRIENDS OR RELATIVES?: THREE TIMES A WEEK
DO YOU BELONG TO ANY CLUBS OR ORGANIZATIONS SUCH AS CHURCH GROUPS UNIONS, FRATERNAL OR ATHLETIC GROUPS, OR SCHOOL GROUPS?: YES

## 2023-03-03 ENCOUNTER — OFFICE VISIT (OUTPATIENT)
Dept: MEDICAL GROUP | Facility: MEDICAL CENTER | Age: 73
End: 2023-03-03
Payer: MEDICARE

## 2023-03-03 VITALS
SYSTOLIC BLOOD PRESSURE: 122 MMHG | DIASTOLIC BLOOD PRESSURE: 64 MMHG | BODY MASS INDEX: 26.5 KG/M2 | HEIGHT: 62 IN | OXYGEN SATURATION: 97 % | TEMPERATURE: 97.5 F | WEIGHT: 144 LBS | HEART RATE: 79 BPM

## 2023-03-03 DIAGNOSIS — M85.80 OSTEOPENIA, UNSPECIFIED LOCATION: ICD-10-CM

## 2023-03-03 DIAGNOSIS — Z23 NEED FOR VACCINATION: ICD-10-CM

## 2023-03-03 DIAGNOSIS — E55.9 VITAMIN D DEFICIENCY: ICD-10-CM

## 2023-03-03 DIAGNOSIS — E78.00 PURE HYPERCHOLESTEROLEMIA: ICD-10-CM

## 2023-03-03 DIAGNOSIS — Z00.00 MEDICARE ANNUAL WELLNESS VISIT, SUBSEQUENT: Primary | ICD-10-CM

## 2023-03-03 DIAGNOSIS — Z78.0 POSTMENOPAUSAL: ICD-10-CM

## 2023-03-03 DIAGNOSIS — Z00.00 WELL ADULT EXAM: ICD-10-CM

## 2023-03-03 PROCEDURE — G0439 PPPS, SUBSEQ VISIT: HCPCS | Performed by: FAMILY MEDICINE

## 2023-03-03 PROCEDURE — 90677 PCV20 VACCINE IM: CPT | Performed by: FAMILY MEDICINE

## 2023-03-03 PROCEDURE — G0009 ADMIN PNEUMOCOCCAL VACCINE: HCPCS | Performed by: FAMILY MEDICINE

## 2023-03-03 ASSESSMENT — ENCOUNTER SYMPTOMS: GENERAL WELL-BEING: EXCELLENT

## 2023-03-03 ASSESSMENT — ACTIVITIES OF DAILY LIVING (ADL): BATHING_REQUIRES_ASSISTANCE: 0

## 2023-03-03 ASSESSMENT — PATIENT HEALTH QUESTIONNAIRE - PHQ9: CLINICAL INTERPRETATION OF PHQ2 SCORE: 0

## 2023-03-03 NOTE — PROGRESS NOTES
Chief Complaint   Patient presents with    Medicare Annual Wellness       HPI:  Iesha is a 72 y.o. here for Medicare Annual Wellness Visit    Vitamin D deficiency  Taking vit D regularly    Pure hypercholesterolemia  Chronic problem. Due for lipid panel. ASCD risk was     Osteopenia  Due for dexa. Ordered today.      Patient Active Problem List    Diagnosis Date Noted    Chronic pain of right knee 03/11/2022    Chronic hip pain, bilateral 03/11/2022    Overweight 01/03/2022    Pure hypercholesterolemia 12/17/2020    Vitamin D deficiency 12/12/2013    Osteopenia 12/09/2013       Current Outpatient Medications   Medication Sig Dispense Refill    vitamin D (CHOLECALCIFEROL) 1000 UNIT TABS Take 1,000 Units by mouth every day.       No current facility-administered medications for this visit.        Patient is taking medications as noted in medication list.  Current supplements as per medication list.     Allergies: Patient has no known allergies.    Current social contact/activities: Family/Friends     Is patient current with immunizations? No, due for PNEUMOVAX (PPSV23). Patient is interested in receiving None today.    She  reports that she has never smoked. She has never used smokeless tobacco. She reports current alcohol use of about 3.0 - 4.2 oz per week. She reports that she does not use drugs.  Counseling given: Not Answered      ROS:    Gait: Uses no assistive device   Ostomy: No   Other tubes: No   Amputations: No   Chronic oxygen use No   Last eye exam: 2022  Wears hearing aids: No   : Denies any urinary leakage during the last 6 months    Screening:    Depression Screening  Little interest or pleasure in doing things?  0 - not at all  Feeling down, depressed, or hopeless? 0 - not at all  Patient Health Questionnaire Score: 0    If depressive symptoms identified deferred to follow up visit unless specifically addressed in assessment and plan.    Interpretation of PHQ-9 Total Score   Score Severity   1-4 No  Depression   5-9 Mild Depression   10-14 Moderate Depression   15-19 Moderately Severe Depression   20-27 Severe Depression    Screening for Cognitive Impairment  Three Minute Recall (daughter, heaven, mountain)  3/3    Alexis clock face with all 12 numbers and set the hands to show 10 past 11.  Yes    If cognitive concerns identified, deferred for follow up unless specifically addressed in assessment and plan.    Fall Risk Assessment  Has the patient had two or more falls in the last year or any fall with injury in the last year?  No  If fall risk identified, deferred for follow up unless specifically addressed in assessment and plan.    Safety Assessment  Throw rugs on floor.  No  Handrails on all stairs.  Yes  Good lighting in all hallways.  Yes  Difficulty hearing.  No  Patient counseled about all safety risks that were identified.    Functional Assessment ADLs  Are there any barriers preventing you from cooking for yourself or meeting nutritional needs?  No.    Are there any barriers preventing you from driving safely or obtaining transportation?  No.    Are there any barriers preventing you from using a telephone or calling for help?  No.    Are there any barriers preventing you from shopping?  No.    Are there any barriers preventing you from taking care of your own finances?  No.    Are there any barriers preventing you from managing your medications?  No.    Are there any barriers preventing you from showering, bathing or dressing yourself?  No.    Are you currently engaging in any exercise or physical activity?  Yes.     What is your perception of your health?  Excellent.    Advance Care Planning  Do you have an Advance Directive, Living Will, Durable Power of , or POLST? Yes    Living Will     is not on file - instructed patient to bring in a copy to scan into their chart    Health Maintenance Summary            Overdue - IMM ZOSTER VACCINES (2 of 3) Overdue since 11/7/2014 09/12/2014  Imm  Admin: Zoster Vaccine Live (ZVL) (Zostavax) - HISTORICAL DATA              Overdue - COVID-19 Vaccine (4 - Booster for Pfizer series) Overdue since 2/15/2022      12/21/2021  Imm Admin: PFIZER PURPLE CAP SARS-COV-2 VACCINATION (12+)    03/25/2021  Imm Admin: PFIZER PURPLE CAP SARS-COV-2 VACCINATION (12+)    03/04/2021  Imm Admin: PFIZER PURPLE CAP SARS-COV-2 VACCINATION (12+)              Overdue - IMM INFLUENZA (1) Overdue since 9/1/2022 12/17/2020  Imm Admin: Influenza Vaccine Adult HD    10/31/2019  Imm Admin: Influenza Vaccine Adult HD    10/11/2018  Imm Admin: Influenza, Unspecified - HISTORICAL DATA    10/26/2017  Imm Admin: Influenza Vaccine Adult HD    10/08/2016  Imm Admin: Influenza (IM) Preservative Free - HISTORICAL DATA    Only the first 5 history entries have been loaded, but more history exists.              Overdue - COLORECTAL CANCER SCREENING (COLONOSCOPY - Every 10 Years) Overdue since 12/29/2022 12/29/2012  COLONOSCOPY (Reason not specified)              Ordered - BONE DENSITY (Every 5 Years) Ordered on 3/3/2023      02/22/2018  DS-BONE DENSITY STUDY (DEXA)    01/28/2014  DS-BONE DENSITY STUDY (DEXA)              Annual Wellness Visit (Every 366 Days) Next due on 3/3/2024      03/03/2023  Visit Dx: Medicare annual wellness visit, subsequent              MAMMOGRAM (Every 2 Years) Next due on 8/12/2024 08/12/2022  MA-SCREENING MAMMO BILAT W/TOMOSYNTHESIS W/CAD    05/13/2021  MA-SCREENING MAMMO BILAT W/TOMOSYNTHESIS W/CAD    02/19/2020  MA-SCREENING MAMMO BILAT W/TOMOSYNTHESIS W/CAD    09/26/2018  MA-SCREENING MAMMO BILAT W/TOMOSYNTHESIS W/CAD    08/08/2017  MA-MAMMO SCREENING BILAT W/JOSE W/CAD    Only the first 5 history entries have been loaded, but more history exists.              IMM DTaP/Tdap/Td Vaccine (2 - Td or Tdap) Next due on 12/29/2024 12/29/2014  Imm Admin: Tdap Vaccine              HEPATITIS C SCREENING  Completed      04/14/2022  HEP C VIRUS ANTIBODY               "IMM PNEUMOCOCCAL VACCINE: 65+ Years (Series Information) Completed      03/03/2023  Imm Admin: Pneumococcal Conjugate Vaccine (PCV20)    12/31/2019  Imm Admin: Pneumococcal Conjugate Vaccine (Prevnar/PCV-13)              IMM HEP B VACCINE (Series Information) Aged Out      No completion history exists for this topic.              IMM MENINGOCOCCAL ACWY VACCINE (Series Information) Aged Out      No completion history exists for this topic.              Discontinued - CERVICAL CANCER SCREENING  Discontinued        Frequency changed to Never automatically (Topic No Longer Applies)    12/29/2012  Reason not specified                    Patient Care Team:  Nelly Mcbride M.D. as PCP - General (Family Medicine)    Social History     Tobacco Use    Smoking status: Never    Smokeless tobacco: Never   Vaping Use    Vaping Use: Never used   Substance Use Topics    Alcohol use: Yes     Alcohol/week: 3.0 - 4.2 oz     Types: 5 - 7 Glasses of wine per week     Comment: one glass of wine    Drug use: No     Family History   Problem Relation Age of Onset    Heart Disease Mother     Stroke Mother     Hypertension Mother     Hyperlipidemia Mother     Cancer Mother         breast     She  has a past medical history of Endometriosis of ovary and Osteopenia (12/9/2013).   Past Surgical History:   Procedure Laterality Date    OOPHORECTOMY  1980    partial    TONSILLECTOMY         Exam:   /64 (BP Location: Left arm, Patient Position: Sitting, BP Cuff Size: Adult long)   Pulse 79   Temp 36.4 °C (97.5 °F) (Temporal)   Ht 1.575 m (5' 2\")   Wt 65.3 kg (144 lb)   SpO2 97%  Body mass index is 26.34 kg/m².    Hearing good.    Dentition good  Alert, oriented in no acute distress.  Eye contact is good, speech goal directed, affect calm      Assessment and Plan. The following treatment and monitoring plan is recommended:    1. Medicare annual wellness visit, subsequent        2. Vitamin D deficiency  VITAMIN D,25 HYDROXY (DEFICIENCY) "      3. Pure hypercholesterolemia  Lipid Profile      4. Postmenopausal  DS-BONE DENSITY STUDY (DEXA)      5. Well adult exam  Comp Metabolic Panel      6. Osteopenia, unspecified location        7. Need for vaccination  Pneumococcal Conjugate Vaccine 20-Valent (19 yrs+)         Overall patient is doing quite well.   Recommend pneumonia and shingles vaccines  Patient declining colon ca screening for now  Due for dexa which was ordered.     Services suggested: No services needed at this time  Health Care Screening recommendations as per orders if indicated.  Referrals offered: PT/OT/Nutrition counseling/Behavioral Health/Smoking cessation as per orders if indicated.    Discussion today about general wellness and lifestyle habits:    Prevent falls and reduce trip hazards; Cautioned about securing or removing rugs.  Have a working fire alarm and carbon monoxide detector;   Engage in regular physical activity and social activities     Follow-up: No follow-ups on file.

## 2023-03-28 ENCOUNTER — DOCUMENTATION (OUTPATIENT)
Dept: HEALTH INFORMATION MANAGEMENT | Facility: OTHER | Age: 73
End: 2023-03-28
Payer: MEDICARE

## 2023-04-12 ENCOUNTER — HOSPITAL ENCOUNTER (OUTPATIENT)
Dept: LAB | Facility: MEDICAL CENTER | Age: 73
End: 2023-04-12
Attending: FAMILY MEDICINE
Payer: MEDICARE

## 2023-04-12 DIAGNOSIS — Z00.00 WELL ADULT EXAM: ICD-10-CM

## 2023-04-12 DIAGNOSIS — E55.9 VITAMIN D DEFICIENCY: ICD-10-CM

## 2023-04-12 DIAGNOSIS — E78.00 PURE HYPERCHOLESTEROLEMIA: ICD-10-CM

## 2023-04-12 LAB
25(OH)D3 SERPL-MCNC: 33 NG/ML (ref 30–100)
ALBUMIN SERPL BCP-MCNC: 4.4 G/DL (ref 3.2–4.9)
ALBUMIN/GLOB SERPL: 1.6 G/DL
ALP SERPL-CCNC: 81 U/L (ref 30–99)
ALT SERPL-CCNC: 12 U/L (ref 2–50)
ANION GAP SERPL CALC-SCNC: 12 MMOL/L (ref 7–16)
AST SERPL-CCNC: 16 U/L (ref 12–45)
BILIRUB SERPL-MCNC: 0.5 MG/DL (ref 0.1–1.5)
BUN SERPL-MCNC: 13 MG/DL (ref 8–22)
CALCIUM ALBUM COR SERPL-MCNC: 8.8 MG/DL (ref 8.5–10.5)
CALCIUM SERPL-MCNC: 9.1 MG/DL (ref 8.5–10.5)
CHLORIDE SERPL-SCNC: 102 MMOL/L (ref 96–112)
CHOLEST SERPL-MCNC: 244 MG/DL (ref 100–199)
CO2 SERPL-SCNC: 25 MMOL/L (ref 20–33)
CREAT SERPL-MCNC: 0.65 MG/DL (ref 0.5–1.4)
FASTING STATUS PATIENT QL REPORTED: NORMAL
GFR SERPLBLD CREATININE-BSD FMLA CKD-EPI: 94 ML/MIN/1.73 M 2
GLOBULIN SER CALC-MCNC: 2.7 G/DL (ref 1.9–3.5)
GLUCOSE SERPL-MCNC: 99 MG/DL (ref 65–99)
HDLC SERPL-MCNC: 77 MG/DL
LDLC SERPL CALC-MCNC: 150 MG/DL
POTASSIUM SERPL-SCNC: 4.5 MMOL/L (ref 3.6–5.5)
PROT SERPL-MCNC: 7.1 G/DL (ref 6–8.2)
SODIUM SERPL-SCNC: 139 MMOL/L (ref 135–145)
TRIGL SERPL-MCNC: 87 MG/DL (ref 0–149)

## 2023-04-12 PROCEDURE — 36415 COLL VENOUS BLD VENIPUNCTURE: CPT

## 2023-04-12 PROCEDURE — 80053 COMPREHEN METABOLIC PANEL: CPT

## 2023-04-12 PROCEDURE — 80061 LIPID PANEL: CPT

## 2023-04-12 PROCEDURE — 82306 VITAMIN D 25 HYDROXY: CPT

## 2023-04-13 ENCOUNTER — HOSPITAL ENCOUNTER (OUTPATIENT)
Dept: RADIOLOGY | Facility: MEDICAL CENTER | Age: 73
End: 2023-04-13
Attending: FAMILY MEDICINE
Payer: MEDICARE

## 2023-04-13 DIAGNOSIS — Z78.0 POSTMENOPAUSAL: ICD-10-CM

## 2023-04-13 PROCEDURE — 77080 DXA BONE DENSITY AXIAL: CPT

## 2023-10-09 ENCOUNTER — HOSPITAL ENCOUNTER (OUTPATIENT)
Dept: RADIOLOGY | Facility: MEDICAL CENTER | Age: 73
End: 2023-10-09
Attending: FAMILY MEDICINE
Payer: MEDICARE

## 2023-10-09 DIAGNOSIS — Z12.31 ENCOUNTER FOR MAMMOGRAM TO ESTABLISH BASELINE MAMMOGRAM: ICD-10-CM

## 2023-10-09 PROCEDURE — 77063 BREAST TOMOSYNTHESIS BI: CPT

## 2023-10-19 ENCOUNTER — APPOINTMENT (OUTPATIENT)
Dept: LAB | Facility: MEDICAL CENTER | Age: 73
End: 2023-10-19
Payer: MEDICARE

## 2024-10-15 ENCOUNTER — HOSPITAL ENCOUNTER (OUTPATIENT)
Dept: RADIOLOGY | Facility: MEDICAL CENTER | Age: 74
End: 2024-10-15
Attending: FAMILY MEDICINE
Payer: MEDICARE

## 2024-10-15 DIAGNOSIS — Z12.31 VISIT FOR SCREENING MAMMOGRAM: ICD-10-CM

## 2024-10-15 PROCEDURE — 77067 SCR MAMMO BI INCL CAD: CPT

## 2025-04-23 ENCOUNTER — HOSPITAL ENCOUNTER (OUTPATIENT)
Dept: RADIOLOGY | Facility: MEDICAL CENTER | Age: 75
End: 2025-04-23
Attending: FAMILY MEDICINE
Payer: MEDICARE

## 2025-04-23 DIAGNOSIS — Z78.0 POSTMENOPAUSAL: ICD-10-CM

## 2025-04-23 DIAGNOSIS — M85.80 OSTEOPENIA OF THE ELDERLY: ICD-10-CM

## 2025-04-23 PROCEDURE — 77080 DXA BONE DENSITY AXIAL: CPT

## 2025-04-30 ENCOUNTER — APPOINTMENT (OUTPATIENT)
Dept: URBAN - METROPOLITAN AREA CLINIC 4 | Facility: CLINIC | Age: 75
Setting detail: DERMATOLOGY
End: 2025-04-30

## 2025-04-30 DIAGNOSIS — L81.4 OTHER MELANIN HYPERPIGMENTATION: ICD-10-CM

## 2025-04-30 DIAGNOSIS — L82.1 OTHER SEBORRHEIC KERATOSIS: ICD-10-CM

## 2025-04-30 DIAGNOSIS — Z85.828 PERSONAL HISTORY OF OTHER MALIGNANT NEOPLASM OF SKIN: ICD-10-CM

## 2025-04-30 DIAGNOSIS — L82.0 INFLAMED SEBORRHEIC KERATOSIS: ICD-10-CM

## 2025-04-30 DIAGNOSIS — Z71.89 OTHER SPECIFIED COUNSELING: ICD-10-CM

## 2025-04-30 DIAGNOSIS — D18.0 HEMANGIOMA: ICD-10-CM

## 2025-04-30 DIAGNOSIS — D22 MELANOCYTIC NEVI: ICD-10-CM

## 2025-04-30 PROBLEM — D18.01 HEMANGIOMA OF SKIN AND SUBCUTANEOUS TISSUE: Status: ACTIVE | Noted: 2025-04-30

## 2025-04-30 PROBLEM — D22.5 MELANOCYTIC NEVI OF TRUNK: Status: ACTIVE | Noted: 2025-04-30

## 2025-04-30 PROCEDURE — ? SUNSCREEN RECOMMENDATIONS

## 2025-04-30 PROCEDURE — 17110 DESTRUCTION B9 LES UP TO 14: CPT

## 2025-04-30 PROCEDURE — 99203 OFFICE O/P NEW LOW 30 MIN: CPT | Mod: 25

## 2025-04-30 PROCEDURE — ? LIQUID NITROGEN

## 2025-04-30 PROCEDURE — ? COUNSELING

## 2025-04-30 ASSESSMENT — LOCATION DETAILED DESCRIPTION DERM
LOCATION DETAILED: SUPERIOR LUMBAR SPINE
LOCATION DETAILED: LEFT DORSAL WRIST
LOCATION DETAILED: LEFT POSTERIOR SHOULDER
LOCATION DETAILED: PERIUMBILICAL SKIN
LOCATION DETAILED: NASAL ROOT
LOCATION DETAILED: RIGHT POSTERIOR SHOULDER
LOCATION DETAILED: INFERIOR THORACIC SPINE

## 2025-04-30 ASSESSMENT — LOCATION SIMPLE DESCRIPTION DERM
LOCATION SIMPLE: RIGHT SHOULDER
LOCATION SIMPLE: LOWER BACK
LOCATION SIMPLE: NOSE
LOCATION SIMPLE: LEFT WRIST
LOCATION SIMPLE: ABDOMEN
LOCATION SIMPLE: LEFT SHOULDER
LOCATION SIMPLE: UPPER BACK

## 2025-04-30 ASSESSMENT — LOCATION ZONE DERM
LOCATION ZONE: NOSE
LOCATION ZONE: TRUNK
LOCATION ZONE: ARM